# Patient Record
Sex: MALE | Race: WHITE | NOT HISPANIC OR LATINO | Employment: UNEMPLOYED | URBAN - METROPOLITAN AREA
[De-identification: names, ages, dates, MRNs, and addresses within clinical notes are randomized per-mention and may not be internally consistent; named-entity substitution may affect disease eponyms.]

---

## 2017-10-18 PROBLEM — F29 PSYCHOSIS: Status: ACTIVE | Noted: 2017-10-18

## 2017-10-20 PROBLEM — F31.30 BIPOLAR AFFECTIVE DISORDER, CURRENT EPISODE DEPRESSED: Status: ACTIVE | Noted: 2017-10-20

## 2017-10-20 PROBLEM — F10.10 ALCOHOL ABUSE: Status: ACTIVE | Noted: 2017-10-20

## 2017-10-20 PROBLEM — F39 UNSPECIFIED MOOD (AFFECTIVE) DISORDER: Status: ACTIVE | Noted: 2017-10-20

## 2017-10-20 PROBLEM — F17.210 CIGARETTE NICOTINE DEPENDENCE WITHOUT COMPLICATION: Status: ACTIVE | Noted: 2017-10-20

## 2017-10-20 PROBLEM — F15.20: Status: ACTIVE | Noted: 2017-10-20

## 2017-10-22 PROBLEM — E78.5 HYPERLIPIDEMIA: Status: ACTIVE | Noted: 2017-10-22

## 2017-10-25 PROBLEM — F31.75 BIPOLAR 1 DISORDER, DEPRESSED, PARTIAL REMISSION: Status: ACTIVE | Noted: 2017-10-20

## 2017-10-25 PROBLEM — F31.30 BIPOLAR AFFECTIVE DISORDER, CURRENT EPISODE DEPRESSED: Status: ACTIVE | Noted: 2017-10-25

## 2017-10-25 PROBLEM — F29 PSYCHOSIS: Status: RESOLVED | Noted: 2017-10-18 | Resolved: 2017-10-25

## 2017-10-25 PROBLEM — F63.9 IMPULSE CONTROL DISORDER IN ADULT: Status: ACTIVE | Noted: 2017-10-25

## 2017-10-25 PROBLEM — F39 UNSPECIFIED MOOD (AFFECTIVE) DISORDER: Status: RESOLVED | Noted: 2017-10-20 | Resolved: 2017-10-25

## 2018-03-21 ENCOUNTER — HOSPITAL ENCOUNTER (EMERGENCY)
Facility: HOSPITAL | Age: 33
Discharge: PSYCHIATRIC OR SUBSTANCE USE FACILITY | End: 2018-03-21
Attending: EMERGENCY MEDICINE
Payer: MEDICAID

## 2018-03-21 VITALS
WEIGHT: 211.31 LBS | OXYGEN SATURATION: 96 % | HEIGHT: 72 IN | SYSTOLIC BLOOD PRESSURE: 112 MMHG | HEART RATE: 86 BPM | RESPIRATION RATE: 20 BRPM | BODY MASS INDEX: 28.62 KG/M2 | TEMPERATURE: 98 F | DIASTOLIC BLOOD PRESSURE: 70 MMHG

## 2018-03-21 DIAGNOSIS — F31.32 BIPOLAR AFFECTIVE DISORDER, CURRENTLY DEPRESSED, MODERATE: ICD-10-CM

## 2018-03-21 DIAGNOSIS — R45.851 SUICIDAL IDEATION: Primary | ICD-10-CM

## 2018-03-21 PROBLEM — T50.901A ACCIDENTAL OVERDOSE: Status: ACTIVE | Noted: 2018-03-21

## 2018-03-21 LAB
ALBUMIN SERPL BCP-MCNC: 4.8 G/DL
ALP SERPL-CCNC: 58 U/L
ALT SERPL W/O P-5'-P-CCNC: 15 U/L
AMPHET+METHAMPHET UR QL: NEGATIVE
ANION GAP SERPL CALC-SCNC: 10 MMOL/L
APAP SERPL-MCNC: <3 UG/ML
AST SERPL-CCNC: 32 U/L
BARBITURATES UR QL SCN>200 NG/ML: NEGATIVE
BASOPHILS # BLD AUTO: 0.01 K/UL
BASOPHILS NFR BLD: 0.1 %
BENZODIAZ UR QL SCN>200 NG/ML: NEGATIVE
BILIRUB SERPL-MCNC: 0.7 MG/DL
BILIRUB UR QL STRIP: NEGATIVE
BUN SERPL-MCNC: 9 MG/DL
BZE UR QL SCN: NEGATIVE
CALCIUM SERPL-MCNC: 10 MG/DL
CANNABINOIDS UR QL SCN: NORMAL
CHLORIDE SERPL-SCNC: 106 MMOL/L
CLARITY UR: CLEAR
CO2 SERPL-SCNC: 24 MMOL/L
COLOR UR: YELLOW
CREAT SERPL-MCNC: 1.1 MG/DL
CREAT UR-MCNC: 189.9 MG/DL
DIFFERENTIAL METHOD: ABNORMAL
EOSINOPHIL # BLD AUTO: 0 K/UL
EOSINOPHIL NFR BLD: 0.3 %
ERYTHROCYTE [DISTWIDTH] IN BLOOD BY AUTOMATED COUNT: 13.9 %
EST. GFR  (AFRICAN AMERICAN): >60 ML/MIN/1.73 M^2
EST. GFR  (NON AFRICAN AMERICAN): >60 ML/MIN/1.73 M^2
ETHANOL SERPL-MCNC: <10 MG/DL
GLUCOSE SERPL-MCNC: 66 MG/DL
GLUCOSE UR QL STRIP: NEGATIVE
HCT VFR BLD AUTO: 47 %
HGB BLD-MCNC: 16.1 G/DL
HGB UR QL STRIP: NEGATIVE
KETONES UR QL STRIP: ABNORMAL
LEUKOCYTE ESTERASE UR QL STRIP: NEGATIVE
LYMPHOCYTES # BLD AUTO: 2.5 K/UL
LYMPHOCYTES NFR BLD: 24.5 %
MCH RBC QN AUTO: 31.6 PG
MCHC RBC AUTO-ENTMCNC: 34.3 G/DL
MCV RBC AUTO: 92 FL
METHADONE UR QL SCN>300 NG/ML: NEGATIVE
MONOCYTES # BLD AUTO: 1.1 K/UL
MONOCYTES NFR BLD: 10.9 %
NEUTROPHILS # BLD AUTO: 6.5 K/UL
NEUTROPHILS NFR BLD: 64.2 %
NITRITE UR QL STRIP: NEGATIVE
OPIATES UR QL SCN: NEGATIVE
PCP UR QL SCN>25 NG/ML: NEGATIVE
PH UR STRIP: 7 [PH] (ref 5–8)
PLATELET # BLD AUTO: 338 K/UL
PMV BLD AUTO: 8.8 FL
POTASSIUM SERPL-SCNC: 3.7 MMOL/L
PROT SERPL-MCNC: 7.9 G/DL
PROT UR QL STRIP: NEGATIVE
RBC # BLD AUTO: 5.09 M/UL
SALICYLATES SERPL-MCNC: <5 MG/DL
SODIUM SERPL-SCNC: 140 MMOL/L
SP GR UR STRIP: 1.02 (ref 1–1.03)
TOXICOLOGY INFORMATION: NORMAL
TSH SERPL DL<=0.005 MIU/L-ACNC: 1.41 UIU/ML
URN SPEC COLLECT METH UR: ABNORMAL
UROBILINOGEN UR STRIP-ACNC: NEGATIVE EU/DL
WBC # BLD AUTO: 10.17 K/UL

## 2018-03-21 PROCEDURE — 80053 COMPREHEN METABOLIC PANEL: CPT

## 2018-03-21 PROCEDURE — 99283 EMERGENCY DEPT VISIT LOW MDM: CPT | Mod: AF,HB,, | Performed by: PSYCHIATRY & NEUROLOGY

## 2018-03-21 PROCEDURE — 84443 ASSAY THYROID STIM HORMONE: CPT

## 2018-03-21 PROCEDURE — 80320 DRUG SCREEN QUANTALCOHOLS: CPT

## 2018-03-21 PROCEDURE — 99285 EMERGENCY DEPT VISIT HI MDM: CPT

## 2018-03-21 PROCEDURE — 80329 ANALGESICS NON-OPIOID 1 OR 2: CPT

## 2018-03-21 PROCEDURE — 85025 COMPLETE CBC W/AUTO DIFF WBC: CPT

## 2018-03-21 PROCEDURE — 80307 DRUG TEST PRSMV CHEM ANLYZR: CPT

## 2018-03-21 PROCEDURE — 81003 URINALYSIS AUTO W/O SCOPE: CPT

## 2018-03-21 NOTE — ED NOTES
Patient c/o suicidal thoughts that began yesterday; reports he did not try to do anything to take his life, but reports he has had thoughts of harming himself.    Patient identifiers verified and correct for Dimitrios Arzola.    LOC: The patient is awake, alert and aware of environment with an appropriate affect, the patient is oriented x 3 and speaking appropriately.  APPEARANCE: Patient resting comfortably and in no acute distress, patient is clean and well groomed, patient's clothing is properly fastened.  SKIN: The skin is warm and dry, color consistent with ethnicity, patient has normal skin turgor and moist mucus membranes, skin intact, no breakdown or bruising noted. Abrasion to right knee, scabbed, abrasion to right ankle, scabbed.  MUSCULOSKELETAL: Patient moving all extremities spontaneously.  RESPIRATORY: Airway is open and patent, respirations are spontaneous.  CARDIAC: Patient has a normal rate, no periphreal edema noted, capillary refill < 3 seconds.  ABDOMEN: Soft and non tender to palpation.

## 2018-03-21 NOTE — ED PROVIDER NOTES
"SCRIBE #1 NOTE: I, Yamil Gonsalez, am scribing for, and in the presence of, Chuck Valencia MD. I have scribed the entire note.      History      Chief Complaint   Patient presents with    Suicidal     Pt states, "I take about 5-6 boxes of Coricidin a day and I started feeling suicidal today and I need some help."       Review of patient's allergies indicates:   Allergen Reactions    Risperdal [risperidone] Other (See Comments)     gynecomastica        HPI   HPI    3/21/2018, 2:52 PM   History obtained from the patient      History of Present Illness: Dimitrios Arzola is a 32 y.o. male patient who presents to the Emergency Department for SI which onset today. Symptoms are constant and moderate in severity. Pt takes about 5-6 boxes of Coricidin a day. Pt states "I'm ready to die." No mitigating or exacerbating factors reported. No associated sx reported. Patient denies any fever, chills, n/v/d, HI, hallucinations, ETOH use, IV drug use, and all other sxs at this time. No further complaints or concerns at this time.         Arrival mode: Personal vehicle     PCP: Primary Doctor No       Past Medical History:  Past Medical History:   Diagnosis Date    ADHD (attention deficit hyperactivity disorder)     Alcohol abuse     Anxiety     Bipolar affective     Depression     History of psychiatric hospitalization     Hx of psychiatric care     Psychiatric problem     Psychosis 10/18/2017    Therapy        Past Surgical History:  Past Surgical History:   Procedure Laterality Date    ANKLE SURGERY      right         Family History:  Family History   Problem Relation Age of Onset    Family history unknown: Yes       Social History:  Social History     Social History Main Topics    Smoking status: Current Every Day Smoker     Packs/day: 0.50     Types: Cigarettes    Smokeless tobacco: Never Used    Alcohol use Yes      Comment: Vodka occasionally     Drug use: Yes     Types: Marijuana    Sexual activity: " Yes     Partners: Female     Birth control/ protection: None       ROS   Review of Systems   Constitutional: Negative for chills and fever.   HENT: Negative for sore throat.    Respiratory: Negative for shortness of breath.    Cardiovascular: Negative for chest pain.   Gastrointestinal: Negative for diarrhea, nausea and vomiting.   Genitourinary: Negative for dysuria.   Musculoskeletal: Negative for back pain.   Skin: Negative for rash.   Neurological: Negative for dizziness, weakness, light-headedness, numbness and headaches.   Hematological: Does not bruise/bleed easily.   Psychiatric/Behavioral: Positive for suicidal ideas. Negative for hallucinations.        - HI  - ETOH use  - IV drug use       Physical Exam      Initial Vitals [03/21/18 1438]   BP Pulse Resp Temp SpO2   (!) 162/113 106 20 98.8 °F (37.1 °C) 96 %      MAP       129.33          Physical Exam  Nursing Notes and Vital Signs Reviewed.  Constitutional: Patient is in no acute distress. Well-developed and well-nourished.  Head: Atraumatic. Normocephalic.  Eyes: PERRL. EOM intact. Conjunctivae are not pale. No scleral icterus.  ENT: Mucous membranes are moist. Oropharynx is clear and symmetric.    Neck: Supple. Full ROM. No lymphadenopathy.  Cardiovascular: Regular rate. Regular rhythm. No murmurs, rubs, or gallops. Distal pulses are 2+ and symmetric.  Pulmonary/Chest: No respiratory distress. Clear to auscultation bilaterally. No wheezing or rales.  Abdominal: Soft and non-distended.  There is no tenderness.  No rebound, guarding, or rigidity.   Musculoskeletal: Moves all extremities. No obvious deformities. No edema.   Skin: Warm and dry.  Neurological:  Alert, awake, and appropriate.  Normal speech.  No acute focal neurological deficits are appreciated.  Psychiatric:               Behavior: reluctant to participate, eye contact minimal              Mood and Affect: depressed affect              Thought Process: goal directed              Suicidal  Ideations: Yes              Suicidal Plan: General plan to harm self.              Homicidal Ideations: No              Hallucinations: none      ED Course    Procedures  ED Vital Signs:  Vitals:    03/21/18 1438   BP: (!) 162/113   Pulse: 106   Resp: 20   Temp: 98.8 °F (37.1 °C)   TempSrc: Oral   SpO2: 96%   Weight: 95.8 kg (211 lb 5 oz)   Height: 6' (1.829 m)       Abnormal Lab Results:  Labs Reviewed   CBC W/ AUTO DIFFERENTIAL - Abnormal; Notable for the following:        Result Value    MCH 31.6 (*)     MPV 8.8 (*)     Mono # 1.1 (*)     All other components within normal limits   COMPREHENSIVE METABOLIC PANEL - Abnormal; Notable for the following:     Glucose 66 (*)     All other components within normal limits   URINALYSIS - Abnormal; Notable for the following:     Ketones, UA Trace (*)     All other components within normal limits   SALICYLATE LEVEL - Abnormal; Notable for the following:     Salicylate Lvl <5.0 (*)     All other components within normal limits   ACETAMINOPHEN LEVEL - Abnormal; Notable for the following:     Acetaminophen (Tylenol), Serum <3.0 (*)     All other components within normal limits   TSH   DRUG SCREEN PANEL, URINE EMERGENCY   ALCOHOL,MEDICAL (ETHANOL)        All Lab Results:  Results for orders placed or performed during the hospital encounter of 03/21/18   CBC auto differential   Result Value Ref Range    WBC 10.17 3.90 - 12.70 K/uL    RBC 5.09 4.60 - 6.20 M/uL    Hemoglobin 16.1 14.0 - 18.0 g/dL    Hematocrit 47.0 40.0 - 54.0 %    MCV 92 82 - 98 fL    MCH 31.6 (H) 27.0 - 31.0 pg    MCHC 34.3 32.0 - 36.0 g/dL    RDW 13.9 11.5 - 14.5 %    Platelets 338 150 - 350 K/uL    MPV 8.8 (L) 9.2 - 12.9 fL    Gran # (ANC) 6.5 1.8 - 7.7 K/uL    Lymph # 2.5 1.0 - 4.8 K/uL    Mono # 1.1 (H) 0.3 - 1.0 K/uL    Eos # 0.0 0.0 - 0.5 K/uL    Baso # 0.01 0.00 - 0.20 K/uL    Gran% 64.2 38.0 - 73.0 %    Lymph% 24.5 18.0 - 48.0 %    Mono% 10.9 4.0 - 15.0 %    Eosinophil% 0.3 0.0 - 8.0 %    Basophil% 0.1  0.0 - 1.9 %    Differential Method Automated    Comprehensive metabolic panel   Result Value Ref Range    Sodium 140 136 - 145 mmol/L    Potassium 3.7 3.5 - 5.1 mmol/L    Chloride 106 95 - 110 mmol/L    CO2 24 23 - 29 mmol/L    Glucose 66 (L) 70 - 110 mg/dL    BUN, Bld 9 6 - 20 mg/dL    Creatinine 1.1 0.5 - 1.4 mg/dL    Calcium 10.0 8.7 - 10.5 mg/dL    Total Protein 7.9 6.0 - 8.4 g/dL    Albumin 4.8 3.5 - 5.2 g/dL    Total Bilirubin 0.7 0.1 - 1.0 mg/dL    Alkaline Phosphatase 58 55 - 135 U/L    AST 32 10 - 40 U/L    ALT 15 10 - 44 U/L    Anion Gap 10 8 - 16 mmol/L    eGFR if African American >60 >60 mL/min/1.73 m^2    eGFR if non African American >60 >60 mL/min/1.73 m^2   Urinalysis   Result Value Ref Range    Specimen UA Urine, Clean Catch     Color, UA Yellow Yellow, Straw, Geovanna    Appearance, UA Clear Clear    pH, UA 7.0 5.0 - 8.0    Specific Gravity, UA 1.025 1.005 - 1.030    Protein, UA Negative Negative    Glucose, UA Negative Negative    Ketones, UA Trace (A) Negative    Bilirubin (UA) Negative Negative    Occult Blood UA Negative Negative    Nitrite, UA Negative Negative    Urobilinogen, UA Negative <2.0 EU/dL    Leukocytes, UA Negative Negative   TSH   Result Value Ref Range    TSH 1.405 0.400 - 4.000 uIU/mL   Drug screen panel, emergency   Result Value Ref Range    Benzodiazepines Negative     Methadone metabolites Negative     Cocaine (Metab.) Negative     Opiate Scrn, Ur Negative     Barbiturate Screen, Ur Negative     Amphetamine Screen, Ur Negative     THC Presumptive Positive     Phencyclidine Negative     Creatinine, Random Ur 189.9 23.0 - 375.0 mg/dL    Toxicology Information SEE COMMENT    Ethanol   Result Value Ref Range    Alcohol, Medical, Serum <10 <10 mg/dL   Salicylate level   Result Value Ref Range    Salicylate Lvl <5.0 (L) 15.0 - 30.0 mg/dL   Acetaminophen level   Result Value Ref Range    Acetaminophen (Tylenol), Serum <3.0 (L) 10.0 - 20.0 ug/mL                The Emergency Provider  reviewed the vital signs and test results, which are outlined above.    ED Discussion     3:00 PM: The PEC hold has been issued by Dr. Valencia at this time for SI.    4:58 PM: Pt has been medically cleared by Dr. Valencia at this time. Reassessed pt at this time. Pt is resting comfortably and appears in no acute distress. There are no psychiatric services offered at this facility. D/w pt all pertinent ED information and plan to transfer to psychiatric facility for psychiatric treatment. Pt will be transported by personnel trained in CPR and CPI. All questions and complaints have been addressed at this time. Pt condition is stable at this time and is clear to transfer to psychiatric facility at this time.           ED Medication(s):  Medications - No data to display    New Prescriptions    No medications on file             Medical Decision Making    Medical Decision Making:   Clinical Tests:   Lab Tests: Ordered and Reviewed           Scribe Attestation:   Scribe #1: I performed the above scribed service and the documentation accurately describes the services I performed. I attest to the accuracy of the note.    Attending:   Physician Attestation Statement for Scribe #1: I, Chuck Valencia MD, personally performed the services described in this documentation, as scribed by Yamil Gonsalez, in my presence, and it is both accurate and complete.          Clinical Impression       ICD-10-CM ICD-9-CM   1. Suicidal ideation R45.851 V62.84       Disposition:   Disposition: Transferred  Condition: Stable         Chuck Valencia MD  03/21/18 1932

## 2018-03-22 NOTE — PROVIDER PROGRESS NOTES - EMERGENCY DEPT.
9:06 PM:  Pt is resting comfortably and is in no acute distress. Pt has been accepted at [facility] by Dr. Elizondo.  D/w pt all pertinent results. /w pt any concerns expressed at this time. Answered all questions. Pt expresses understanding at this time.

## 2018-03-22 NOTE — ED NOTES
Made contact with Lillie RN, pt's nurse. Informed Lillie that pt. Has been accepted to [facility].
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
## 2018-03-22 NOTE — ED NOTES
Received call from Kaylah ( Intake Spec) [facility]. Located at [address]. Pt has been accepted by Dr. Elizondo, please call report to  [phone] EXT. 2.

## 2018-03-22 NOTE — ED NOTES
Pt's room secured per protocol. Pt's belongings secured and pt in grey gown and yellow socks. Pt being directly monitored by carole Joe at this time. Pt. Laying  in bed. No acute distress, RR equal and non labored, VSS. Bed in low and locked position. Will continue to monitor.

## 2018-03-22 NOTE — ED NOTES
Pt's room secured per protocol. Pt's belongings secured and pt in grey gown and yellow socks. Pt being directly monitored by carole Stoll at this time. Pt. Laying  in bed. No acute distress, RR equal and non labored, VSS. Bed in low and locked position. Will continue to monitor.

## 2018-03-22 NOTE — ED NOTES
Pec received and faxed to [facility], [facility], [facility], [facility], [facility], [facility], [facility], [facility], [facility], [place], [facility], [facility], [facility], [place], [facility] ,[facility], [facility], [facility], [place],  [facility], [facility],  [facility], [place], [facility], [facility], [facility], [facility], [facility].

## 2018-10-11 ENCOUNTER — HOSPITAL ENCOUNTER (EMERGENCY)
Facility: HOSPITAL | Age: 33
Discharge: PSYCHIATRIC OR SUBSTANCE USE FACILITY | End: 2018-10-12
Attending: EMERGENCY MEDICINE
Payer: MEDICAID

## 2018-10-11 DIAGNOSIS — R45.851 SUICIDAL IDEATION: Primary | ICD-10-CM

## 2018-10-11 DIAGNOSIS — F32.A DEPRESSION, UNSPECIFIED DEPRESSION TYPE: ICD-10-CM

## 2018-10-11 DIAGNOSIS — R00.0 TACHYCARDIA: ICD-10-CM

## 2018-10-11 LAB
ALBUMIN SERPL BCP-MCNC: 4.4 G/DL
ALP SERPL-CCNC: 39 U/L
ALT SERPL W/O P-5'-P-CCNC: 53 U/L
AMPHET+METHAMPHET UR QL: NEGATIVE
ANION GAP SERPL CALC-SCNC: 17 MMOL/L
APAP SERPL-MCNC: <3 UG/ML
AST SERPL-CCNC: 46 U/L
BARBITURATES UR QL SCN>200 NG/ML: NEGATIVE
BASOPHILS # BLD AUTO: 0.03 K/UL
BASOPHILS NFR BLD: 0.2 %
BENZODIAZ UR QL SCN>200 NG/ML: NEGATIVE
BILIRUB SERPL-MCNC: 0.5 MG/DL
BILIRUB UR QL STRIP: NEGATIVE
BUN SERPL-MCNC: 11 MG/DL
BZE UR QL SCN: NEGATIVE
CALCIUM SERPL-MCNC: 10 MG/DL
CANNABINOIDS UR QL SCN: NEGATIVE
CHLORIDE SERPL-SCNC: 101 MMOL/L
CLARITY UR: CLEAR
CO2 SERPL-SCNC: 18 MMOL/L
COLOR UR: YELLOW
CREAT SERPL-MCNC: 1.1 MG/DL
CREAT UR-MCNC: 88.9 MG/DL
DIFFERENTIAL METHOD: ABNORMAL
EOSINOPHIL # BLD AUTO: 0 K/UL
EOSINOPHIL NFR BLD: 0.1 %
ERYTHROCYTE [DISTWIDTH] IN BLOOD BY AUTOMATED COUNT: 13.1 %
EST. GFR  (AFRICAN AMERICAN): >60 ML/MIN/1.73 M^2
EST. GFR  (NON AFRICAN AMERICAN): >60 ML/MIN/1.73 M^2
ETHANOL SERPL-MCNC: <10 MG/DL
GLUCOSE SERPL-MCNC: 74 MG/DL
GLUCOSE UR QL STRIP: NEGATIVE
HCT VFR BLD AUTO: 43.1 %
HGB BLD-MCNC: 14.7 G/DL
HGB UR QL STRIP: NEGATIVE
KETONES UR QL STRIP: ABNORMAL
LEUKOCYTE ESTERASE UR QL STRIP: NEGATIVE
LYMPHOCYTES # BLD AUTO: 1.7 K/UL
LYMPHOCYTES NFR BLD: 9.6 %
MCH RBC QN AUTO: 31.9 PG
MCHC RBC AUTO-ENTMCNC: 34.1 G/DL
MCV RBC AUTO: 94 FL
METHADONE UR QL SCN>300 NG/ML: NEGATIVE
MONOCYTES # BLD AUTO: 1.8 K/UL
MONOCYTES NFR BLD: 10.4 %
NEUTROPHILS # BLD AUTO: 13.8 K/UL
NEUTROPHILS NFR BLD: 79.7 %
NITRITE UR QL STRIP: NEGATIVE
OPIATES UR QL SCN: NEGATIVE
PCP UR QL SCN>25 NG/ML: NEGATIVE
PH UR STRIP: 6 [PH] (ref 5–8)
PLATELET # BLD AUTO: 265 K/UL
PMV BLD AUTO: 9.3 FL
POTASSIUM SERPL-SCNC: 4.2 MMOL/L
PROT SERPL-MCNC: 7.7 G/DL
PROT UR QL STRIP: NEGATIVE
RBC # BLD AUTO: 4.61 M/UL
SALICYLATES SERPL-MCNC: <5 MG/DL
SODIUM SERPL-SCNC: 136 MMOL/L
SP GR UR STRIP: 1.02 (ref 1–1.03)
TOXICOLOGY INFORMATION: NORMAL
TSH SERPL DL<=0.005 MIU/L-ACNC: 0.48 UIU/ML
URN SPEC COLLECT METH UR: ABNORMAL
UROBILINOGEN UR STRIP-ACNC: NEGATIVE EU/DL
WBC # BLD AUTO: 17.31 K/UL

## 2018-10-11 PROCEDURE — 81003 URINALYSIS AUTO W/O SCOPE: CPT | Mod: 59

## 2018-10-11 PROCEDURE — 80320 DRUG SCREEN QUANTALCOHOLS: CPT

## 2018-10-11 PROCEDURE — 93010 ELECTROCARDIOGRAM REPORT: CPT | Mod: ,,, | Performed by: INTERNAL MEDICINE

## 2018-10-11 PROCEDURE — 99213 OFFICE O/P EST LOW 20 MIN: CPT | Mod: GT,AF,HB, | Performed by: PSYCHIATRY & NEUROLOGY

## 2018-10-11 PROCEDURE — 84443 ASSAY THYROID STIM HORMONE: CPT

## 2018-10-11 PROCEDURE — 80307 DRUG TEST PRSMV CHEM ANLYZR: CPT

## 2018-10-11 PROCEDURE — 99285 EMERGENCY DEPT VISIT HI MDM: CPT

## 2018-10-11 PROCEDURE — 85025 COMPLETE CBC W/AUTO DIFF WBC: CPT

## 2018-10-11 PROCEDURE — 80329 ANALGESICS NON-OPIOID 1 OR 2: CPT

## 2018-10-11 PROCEDURE — 80053 COMPREHEN METABOLIC PANEL: CPT

## 2018-10-11 RX ORDER — GABAPENTIN 100 MG/1
200 CAPSULE ORAL 3 TIMES DAILY
COMMUNITY
End: 2018-10-22

## 2018-10-11 NOTE — ED PROVIDER NOTES
"SCRIBE #1 NOTE: I, Jennifer Russlel, am scribing for, and in the presence of, Chuck Valencia MD. I have scried the HPI, ROS, and PEx.     SCRIBE #2 NOTE: I, Osiel Prasad, am scribing for, and in the presence of,  Walker Booker MD. I have scribed the remaining portions of the note not scribed by Scribe #1.      History     Chief Complaint   Patient presents with    Suicidal     Pt reports +SI without plan. Denies HI     Review of patient's allergies indicates:   Allergen Reactions    Risperdal [risperidone] Other (See Comments)     gynecomastica         History of Present Illness     HPI    10/11/2018, 5:57 PM  History obtained from the patient      History of Present Illness: Dimitrios Arzola is a 33 y.o. male patient who presents to the Emergency Department for evaluation of SI. Pt states, "I just want to end it all." Symptoms are constant and moderate in severity.  No mitigating or exacerbating factors reported. No associated sxs. Patient denies any fever, chills, EtOH use, IV drug use, hallucinations, HI, and all other sxs at this time. No further complaints or concerns at this time.       Arrival mode: Personal vehicle    PCP: Primary Doctor No        Past Medical History:  Past Medical History:   Diagnosis Date    ADHD (attention deficit hyperactivity disorder)     Alcohol abuse     Anxiety     Bipolar affective     Depression     History of psychiatric hospitalization     Hx of psychiatric care     Overdose 10/2018    ambien and chloracedin D    Psychiatric problem     Psychosis 10/18/2017    Therapy        Past Surgical History:  Past Surgical History:   Procedure Laterality Date    ANKLE SURGERY      right         Family History:  Family History   Family history unknown: Yes       Social History:  Social History     Tobacco Use    Smoking status: Current Every Day Smoker     Packs/day: 0.50     Types: Cigarettes    Smokeless tobacco: Never Used   Substance and Sexual Activity    " Alcohol use: Yes     Comment: Vodka occasionally     Drug use: Yes     Types: Marijuana    Sexual activity: Yes     Partners: Female     Birth control/protection: None        Review of Systems     Review of Systems   Constitutional: Negative for chills, diaphoresis and fever.   HENT: Negative for congestion, rhinorrhea and sore throat.    Respiratory: Negative for cough and shortness of breath.    Cardiovascular: Negative for chest pain and leg swelling.   Gastrointestinal: Negative for abdominal pain, diarrhea, nausea and vomiting.   Genitourinary: Negative for dysuria, frequency and hematuria.   Musculoskeletal: Negative for back pain and neck pain.   Skin: Negative for rash and wound.   Neurological: Negative for dizziness, weakness, numbness and headaches.   Hematological: Does not bruise/bleed easily.   Psychiatric/Behavioral: Positive for suicidal ideas. Negative for confusion, hallucinations and self-injury. The patient is not nervous/anxious.         (-) HI   All other systems reviewed and are negative.       Physical Exam     Initial Vitals [10/11/18 1737]   BP Pulse Resp Temp SpO2   (!) 154/74 (!) 143 20 99 °F (37.2 °C) (!) 93 %      MAP       --          Physical Exam  Nursing Notes and Vital Signs Reviewed.  Constitutional: Patient is in no acute distress. Disheveled.  Head: Atraumatic. Normocephalic.  Eyes: PERRL. EOM intact. Conjunctivae are not pale. No scleral icterus.  ENT: Mucous membranes are moist. Oropharynx is clear and symmetric.    Neck: Supple. Full ROM. No lymphadenopathy.  Cardiovascular: Regular rate. Regular rhythm. No murmurs, rubs, or gallops. Distal pulses are 2+ and symmetric.  Pulmonary/Chest: No respiratory distress. Clear to auscultation bilaterally. No wheezing or rales.  Abdominal: Soft and non-distended.  There is no tenderness.  No rebound, guarding, or rigidity.   Musculoskeletal: Moves all extremities. No obvious deformities. No edema.  Skin: Warm and dry.  Neurological:   Alert, awake, and appropriate.  Normal speech.  No acute focal neurological deficits are appreciated.  Psychiatric:               Behavior: cooperative              Mood and Affect: depressed affect              Thought Process: within normal limits              Suicidal Ideations: Yes              Suicidal Plan: No specific plan to harm self              Homicidal Ideations: No              Hallucinations: none       ED Course   Procedures  ED Vital Signs:  Vitals:    10/11/18 1737 10/11/18 1806 10/11/18 2006   BP: (!) 154/74 (!) 107/45 114/67   Pulse: (!) 143 (!) 135 (!) 115   Resp: 20 (!) 22 19   Temp: 99 °F (37.2 °C) (!) 100.6 °F (38.1 °C) 99.8 °F (37.7 °C)   TempSrc: Oral Oral Oral   SpO2: (!) 93% 96% 95%   Weight: 112.5 kg (248 lb)     Height: 6' (1.829 m)         Abnormal Lab Results:  Labs Reviewed   CBC W/ AUTO DIFFERENTIAL - Abnormal; Notable for the following components:       Result Value    WBC 17.31 (*)     MCH 31.9 (*)     Gran # (ANC) 13.8 (*)     Mono # 1.8 (*)     Gran% 79.7 (*)     Lymph% 9.6 (*)     All other components within normal limits   COMPREHENSIVE METABOLIC PANEL - Abnormal; Notable for the following components:    CO2 18 (*)     Alkaline Phosphatase 39 (*)     AST 46 (*)     ALT 53 (*)     Anion Gap 17 (*)     All other components within normal limits   URINALYSIS, REFLEX TO URINE CULTURE - Abnormal; Notable for the following components:    Ketones, UA 1+ (*)     All other components within normal limits    Narrative:     Preferred Collection Type->Urine, Clean Catch   SALICYLATE LEVEL - Abnormal; Notable for the following components:    Salicylate Lvl <5.0 (*)     All other components within normal limits   ACETAMINOPHEN LEVEL - Abnormal; Notable for the following components:    Acetaminophen (Tylenol), Serum <3.0 (*)     All other components within normal limits   TSH   DRUG SCREEN PANEL, URINE EMERGENCY    Narrative:     Preferred Collection Type->Urine, Clean Catch   ALCOHOL,MEDICAL  (ETHANOL)        All Lab Results:  Results for orders placed or performed during the hospital encounter of 10/11/18   CBC auto differential   Result Value Ref Range    WBC 17.31 (H) 3.90 - 12.70 K/uL    RBC 4.61 4.60 - 6.20 M/uL    Hemoglobin 14.7 14.0 - 18.0 g/dL    Hematocrit 43.1 40.0 - 54.0 %    MCV 94 82 - 98 fL    MCH 31.9 (H) 27.0 - 31.0 pg    MCHC 34.1 32.0 - 36.0 g/dL    RDW 13.1 11.5 - 14.5 %    Platelets 265 150 - 350 K/uL    MPV 9.3 9.2 - 12.9 fL    Gran # (ANC) 13.8 (H) 1.8 - 7.7 K/uL    Lymph # 1.7 1.0 - 4.8 K/uL    Mono # 1.8 (H) 0.3 - 1.0 K/uL    Eos # 0.0 0.0 - 0.5 K/uL    Baso # 0.03 0.00 - 0.20 K/uL    Gran% 79.7 (H) 38.0 - 73.0 %    Lymph% 9.6 (L) 18.0 - 48.0 %    Mono% 10.4 4.0 - 15.0 %    Eosinophil% 0.1 0.0 - 8.0 %    Basophil% 0.2 0.0 - 1.9 %    Differential Method Automated    Comprehensive metabolic panel   Result Value Ref Range    Sodium 136 136 - 145 mmol/L    Potassium 4.2 3.5 - 5.1 mmol/L    Chloride 101 95 - 110 mmol/L    CO2 18 (L) 23 - 29 mmol/L    Glucose 74 70 - 110 mg/dL    BUN, Bld 11 6 - 20 mg/dL    Creatinine 1.1 0.5 - 1.4 mg/dL    Calcium 10.0 8.7 - 10.5 mg/dL    Total Protein 7.7 6.0 - 8.4 g/dL    Albumin 4.4 3.5 - 5.2 g/dL    Total Bilirubin 0.5 0.1 - 1.0 mg/dL    Alkaline Phosphatase 39 (L) 55 - 135 U/L    AST 46 (H) 10 - 40 U/L    ALT 53 (H) 10 - 44 U/L    Anion Gap 17 (H) 8 - 16 mmol/L    eGFR if African American >60 >60 mL/min/1.73 m^2    eGFR if non African American >60 >60 mL/min/1.73 m^2   Urinalysis, Reflex to Urine Culture Urine, Clean Catch   Result Value Ref Range    Specimen UA Urine, Clean Catch     Color, UA Yellow Yellow, Straw, Geovanna    Appearance, UA Clear Clear    pH, UA 6.0 5.0 - 8.0    Specific Gravity, UA 1.020 1.005 - 1.030    Protein, UA Negative Negative    Glucose, UA Negative Negative    Ketones, UA 1+ (A) Negative    Bilirubin (UA) Negative Negative    Occult Blood UA Negative Negative    Nitrite, UA Negative Negative    Urobilinogen, UA Negative  <2.0 EU/dL    Leukocytes, UA Negative Negative   TSH   Result Value Ref Range    TSH 0.480 0.400 - 4.000 uIU/mL   Drug screen panel, emergency   Result Value Ref Range    Benzodiazepines Negative     Methadone metabolites Negative     Cocaine (Metab.) Negative     Opiate Scrn, Ur Negative     Barbiturate Screen, Ur Negative     Amphetamine Screen, Ur Negative     THC Negative     Phencyclidine Negative     Creatinine, Random Ur 88.9 23.0 - 375.0 mg/dL    Toxicology Information SEE COMMENT    Ethanol   Result Value Ref Range    Alcohol, Medical, Serum <10 <10 mg/dL   Salicylate level   Result Value Ref Range    Salicylate Lvl <5.0 (L) 15.0 - 30.0 mg/dL   Acetaminophen level   Result Value Ref Range    Acetaminophen (Tylenol), Serum <3.0 (L) 10.0 - 20.0 ug/mL         Imaging Results:  Imaging Results    None          The EKG was ordered, reviewed, and independently interpreted by the ED provider.  Interpretation time: 18:20  Rate: 131 BPM  Rhythm: sinus tachycardia  Interpretation: Otherwise normal. No ST&T abnormalities. No STEMI.             The Emergency Provider reviewed the vital signs and test results, which are outlined above.     ED Discussion     7:39 PM: Dr. Valencia discussed the pt's case with Dr. Kelly (Psychiatry) who agrees with [facility].    8:04 PM: Dr. Valencia transfers care of pt to Dr. Booker pending lab results.    8:49 PM: Pt Is medically clear for psychiatric placement.    8:54 PM: Pt has been medically cleared by Dr. Walker Booker MD at this time. Reassessed pt at this time. Pt is resting comfortably and appears in no acute distress. There are no psychiatric services offered at this facility. D/w pt all pertinent ED information and plan to transfer to psychiatric facility for psychiatric treatment. Pt verbalizes understanding. Patient being transferred by [facility] for ongoing personal protection en route. Pt has been made aware of all risks and benefits associated with transfer, including but not limited  to death, MVC, loss of vital signs, and/or permanent disability. Benefits include ability to be treated at an inpatient psychiatric facility. Pt will be transported by personnel trained in CPR and CPI. Patient understands that there could be unforeseen motor vehicle accidents, inclement weather, or loss of vital signs that could result in potential death or permanent disability. All questions and complaints have been addressed at this time. Pt condition is stable at this time and is clear to transfer to psychiatric facility at this time.     11:50 PM: Pt has been accepted at [facility]. Accepting physician is Dr. Jerez. Pt will be transported by [facility] for ongoing personal protection en route. Pt will be transported by personnel trained in CPR and CPI. Risks and benefits of transfer/travel were discussed with pt priorly.         ED Medication(s):  Medications - No data to display       Medical Decision Making     Medical Decision Making:   Clinical Tests:   Lab Tests: Ordered and Reviewed  Medical Tests: Ordered and Reviewed             Scribe Attestation:   Scribe #1: I performed the above scribed service and the documentation accurately describes the services I performed. I attest to the accuracy of the note.     Attending:   Physician Attestation Statement for Scribe #1: I, Chuck Valencia MD, personally performed the services described in this documentation, as scribed by Jennifer Russell, in my presence, and it is both accurate and complete.          Clinical Impression       ICD-10-CM ICD-9-CM   1. Suicidal ideation R45.851 V62.84   2. Tachycardia R00.0 785.0   3. Depression, unspecified depression type F32.9 311       Disposition:   Disposition: Transferred  Condition: Stable            Scribe Attestation:   Scribe #2: I performed the above scribed service and the documentation accurately describes the services I performed. I attest to the accuracy of the note.    Attending Attestation:            Physician Attestation for Scribe:    Physician Attestation Statement for Scribe #2: I, Walker Booker MD, reviewed documentation, as scribed by Osiel Prasad in my presence, and it is both accurate and complete. I also acknowledge and confirm the content of the note done by Scribe #1.             Walker Booker Jr., MD  10/11/18 6219

## 2018-10-11 NOTE — ED NOTES
One large duffle bag of personal hygeine supplies and a change of clothes. One bag of home meds, no narcotics. All placed in locker #1.

## 2018-10-11 NOTE — ED NOTES
Poison control contacted concerning overdose, Nina recommended symptomatic care with basic labs and tylenol level, psych eval, and monitor for agitation and A/VH.

## 2018-10-12 VITALS
DIASTOLIC BLOOD PRESSURE: 62 MMHG | WEIGHT: 248 LBS | RESPIRATION RATE: 19 BRPM | SYSTOLIC BLOOD PRESSURE: 126 MMHG | HEIGHT: 72 IN | BODY MASS INDEX: 33.59 KG/M2 | HEART RATE: 110 BPM | OXYGEN SATURATION: 95 % | TEMPERATURE: 99 F

## 2018-10-12 NOTE — CONSULTS
Tele-Consultation to Emergency Department from Psychiatry    Patient agreeable to consultation via telepsychiatry.    Consultation started: 10/11/2018 at 7:15 pm   The chief complaint leading to psychiatric consultation is: SI  This consultation was requested by Dr. Chuck Trimble, the Emergency Department attending physician.  The location of the consulting psychiatrist is [address] [place] [address].  The patient location is [facility].    Patient Identification:  Dimitrios Arzola is a 33 y.o. male.    Patient information was obtained from patient.    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 Patient reported feelings of depression and SI to ED physician and admitted to overdose of ambien. On my evaluation he admits to feelings of depression including low mood, worthlessness, irritability, insomnia, and feeling hopeless. He reports having SI but states he is not sure if taking the Ambien was a suicide attempt. He does minimize SI when discussion of hospitalization came up but was consistent with reporting his feelings of depression despite compliance with medication. He is taking Depakote, Remeron ,Seroquel, and another medicine he cannot remember.     Psychiatric History:   Hospitalization: Yes  Medication Trials: Yes  Suicide Attempts: states he tied rope around his neck in past but did not go through with it  Violence: yes  Depression: yes  Ruby: yes in context of synthetic marijuana use  AH's: yes in context of synthetic marijuana use  Delusions: yes in context of synthetic marijuana use    Review of Systems:  Negative except as mentioned elsewhere    Past Medical History:   Past Medical History:   Diagnosis Date    ADHD (attention deficit hyperactivity disorder)     Alcohol abuse     Anxiety     Bipolar affective      "Depression     History of psychiatric hospitalization     Hx of psychiatric care     Overdose 10/2018    ambien and chloracedin D    Psychiatric problem     Psychosis 10/18/2017    Therapy         Allergies: see below  Review of patient's allergies indicates:   Allergen Reactions    Risperdal [risperidone] Other (See Comments)     gynecomastica       Medications in ER: Medications - No data to display    Medications at home: Depakote, Seroquel, Remeron     Substance Abuse History:   Alchohol: occasional  Drug: marijuana, past synthetic use      Legal History:   Past charges/incarcerations: 3 DUI's in the past, past arrest for domestic violence but charge was dropped  Pending charges: denies    Family Psychiatric History: unknown    Social History:   History of Physical/Sexual Abuse: unknown  Education: 10th grade    Employment/Disability: unemployed, applied for disability   Financial: unemployed  Relationship Status/Sexual Orientation: single   Children: 4 y/o son   Housing Status: lives with grandparents  Judaism: unknown   History: yes for 4 years   Recreational Activities: unknown  Access to Gun: denies     Current Evaluation:     Constitutional  Vitals:  Vitals:    10/11/18 1737 10/11/18 1806   BP: (!) 154/74 (!) 107/45   Pulse: (!) 143 (!) 135   Resp: 20 (!) 22   Temp: 99 °F (37.2 °C) (!) 100.6 °F (38.1 °C)   TempSrc: Oral Oral   SpO2: (!) 93% 96%   Weight: 112.5 kg (248 lb)    Height: 6' (1.829 m)       General:  unremarkable, age appropriate     Musculoskeletal  Muscle Strength/Tone:   moving arms normally   Gait & Station:   sitting on stretcher     Psychiatric  Level of Consciousness: alert  Orientation: oriented to person, place and time  Grooming: in hospital gown  Psychomotor Behavior: no agitation  Speech: normal in rate, rhythm and volume  Language: uses words appropriately  Mood: "depressed"  Affect: dysphoric  Thought Process: logical  Associations: intact  Thought Content: " +SI  Memory: intact  Attention: intact to interview  Fund of Knowledge: appears adequate  Insight: poor  Judgement: poor    Relevant Elements of Neurological Exam: no abnormality of posture noted    Assessment - Diagnosis - Goals:     Diagnosis/Impression: The patient presents with increasing depression, SI, and possible suicide attempt via Ambien overdose. He warrants inpatient psychiatric stabilization once he is medically stable due to ongoing safety concerns.     Rec:   -medical clearance  -PEC for inpatient psychiatric admission  -case discussed with Dr. Trimble      Time with patient: 16 minutes    More than 50% of the time was spent counseling/coordinating care    Laboratory Data:   Labs Reviewed   CBC W/ AUTO DIFFERENTIAL - Abnormal; Notable for the following components:       Result Value    WBC 17.31 (*)     MCH 31.9 (*)     Gran # (ANC) 13.8 (*)     Mono # 1.8 (*)     Gran% 79.7 (*)     Lymph% 9.6 (*)     All other components within normal limits   COMPREHENSIVE METABOLIC PANEL - Abnormal; Notable for the following components:    CO2 18 (*)     Alkaline Phosphatase 39 (*)     AST 46 (*)     ALT 53 (*)     Anion Gap 17 (*)     All other components within normal limits   URINALYSIS, REFLEX TO URINE CULTURE - Abnormal; Notable for the following components:    Ketones, UA 1+ (*)     All other components within normal limits    Narrative:     Preferred Collection Type->Urine, Clean Catch   TSH   DRUG SCREEN PANEL, URINE EMERGENCY   ALCOHOL,MEDICAL (ETHANOL)   SALICYLATE LEVEL   ACETAMINOPHEN LEVEL         Consulting clinician was informed of the encounter and consult note.    Consultation ended: 10/11/2018 at 7:31 pm

## 2018-10-12 NOTE — ED NOTES
Staff faxed pt's admission packet to [facility], [facility], [facility]

## 2018-10-12 NOTE — ED NOTES
Faxed [facility] information to [facility] will call in about 10-15 minutes to call and see if they received information. RN notified.

## 2018-10-12 NOTE — ED NOTES
Pt's admissions packet has been faxed to all [facility] related facilities. Will wait 30 minutes before seeking placement elsewhere.

## 2018-10-12 NOTE — ED NOTES
Received call from Steph at [facility].  Patient accepted under the care of Dr Jerez.  Number to call report:  [phone].

## 2018-10-22 ENCOUNTER — HOSPITAL ENCOUNTER (EMERGENCY)
Facility: HOSPITAL | Age: 33
Discharge: PSYCHIATRIC OR SUBSTANCE USE FACILITY | End: 2018-10-22
Attending: EMERGENCY MEDICINE
Payer: MEDICAID

## 2018-10-22 VITALS
TEMPERATURE: 99 F | OXYGEN SATURATION: 96 % | HEIGHT: 72 IN | WEIGHT: 244.69 LBS | DIASTOLIC BLOOD PRESSURE: 84 MMHG | RESPIRATION RATE: 19 BRPM | SYSTOLIC BLOOD PRESSURE: 154 MMHG | BODY MASS INDEX: 33.14 KG/M2 | HEART RATE: 97 BPM

## 2018-10-22 DIAGNOSIS — F29 PSYCHOSIS, UNSPECIFIED PSYCHOSIS TYPE: Primary | ICD-10-CM

## 2018-10-22 LAB
ALBUMIN SERPL BCP-MCNC: 4.9 G/DL
ALP SERPL-CCNC: 58 U/L
ALT SERPL W/O P-5'-P-CCNC: 41 U/L
AMPHET+METHAMPHET UR QL: NEGATIVE
ANION GAP SERPL CALC-SCNC: 14 MMOL/L
APAP SERPL-MCNC: <3 UG/ML
AST SERPL-CCNC: 47 U/L
BARBITURATES UR QL SCN>200 NG/ML: NEGATIVE
BASOPHILS # BLD AUTO: 0.02 K/UL
BASOPHILS NFR BLD: 0.2 %
BENZODIAZ UR QL SCN>200 NG/ML: NEGATIVE
BILIRUB SERPL-MCNC: 0.5 MG/DL
BILIRUB UR QL STRIP: ABNORMAL
BUN SERPL-MCNC: 13 MG/DL
BZE UR QL SCN: NEGATIVE
CALCIUM SERPL-MCNC: 9.8 MG/DL
CANNABINOIDS UR QL SCN: NEGATIVE
CHLORIDE SERPL-SCNC: 103 MMOL/L
CLARITY UR: CLEAR
CO2 SERPL-SCNC: 22 MMOL/L
COLOR UR: YELLOW
CREAT SERPL-MCNC: 1.1 MG/DL
CREAT UR-MCNC: 294.5 MG/DL
DIFFERENTIAL METHOD: ABNORMAL
EOSINOPHIL # BLD AUTO: 0 K/UL
EOSINOPHIL NFR BLD: 0.3 %
ERYTHROCYTE [DISTWIDTH] IN BLOOD BY AUTOMATED COUNT: 12.8 %
EST. GFR  (AFRICAN AMERICAN): >60 ML/MIN/1.73 M^2
EST. GFR  (NON AFRICAN AMERICAN): >60 ML/MIN/1.73 M^2
ETHANOL SERPL-MCNC: <10 MG/DL
GLUCOSE SERPL-MCNC: 94 MG/DL
GLUCOSE UR QL STRIP: NEGATIVE
HCT VFR BLD AUTO: 45.2 %
HGB BLD-MCNC: 15.7 G/DL
HGB UR QL STRIP: NEGATIVE
KETONES UR QL STRIP: ABNORMAL
LEUKOCYTE ESTERASE UR QL STRIP: NEGATIVE
LYMPHOCYTES # BLD AUTO: 1.6 K/UL
LYMPHOCYTES NFR BLD: 18.7 %
MCH RBC QN AUTO: 32.2 PG
MCHC RBC AUTO-ENTMCNC: 34.7 G/DL
MCV RBC AUTO: 93 FL
METHADONE UR QL SCN>300 NG/ML: NEGATIVE
MONOCYTES # BLD AUTO: 1 K/UL
MONOCYTES NFR BLD: 11.4 %
NEUTROPHILS # BLD AUTO: 6.1 K/UL
NEUTROPHILS NFR BLD: 69.4 %
NITRITE UR QL STRIP: NEGATIVE
OPIATES UR QL SCN: NEGATIVE
PCP UR QL SCN>25 NG/ML: NORMAL
PH UR STRIP: 6 [PH] (ref 5–8)
PLATELET # BLD AUTO: 360 K/UL
PMV BLD AUTO: 8.7 FL
POTASSIUM SERPL-SCNC: 3.9 MMOL/L
PROT SERPL-MCNC: 8.6 G/DL
PROT UR QL STRIP: ABNORMAL
RBC # BLD AUTO: 4.88 M/UL
SALICYLATES SERPL-MCNC: <5 MG/DL
SODIUM SERPL-SCNC: 139 MMOL/L
SP GR UR STRIP: >=1.03 (ref 1–1.03)
TOXICOLOGY INFORMATION: NORMAL
TSH SERPL DL<=0.005 MIU/L-ACNC: 1.65 UIU/ML
URN SPEC COLLECT METH UR: ABNORMAL
UROBILINOGEN UR STRIP-ACNC: NEGATIVE EU/DL
WBC # BLD AUTO: 8.77 K/UL

## 2018-10-22 PROCEDURE — 80320 DRUG SCREEN QUANTALCOHOLS: CPT

## 2018-10-22 PROCEDURE — 85025 COMPLETE CBC W/AUTO DIFF WBC: CPT

## 2018-10-22 PROCEDURE — 99213 OFFICE O/P EST LOW 20 MIN: CPT | Mod: GT,AF,HB, | Performed by: PSYCHIATRY & NEUROLOGY

## 2018-10-22 PROCEDURE — 80307 DRUG TEST PRSMV CHEM ANLYZR: CPT

## 2018-10-22 PROCEDURE — 81003 URINALYSIS AUTO W/O SCOPE: CPT | Mod: 59

## 2018-10-22 PROCEDURE — 80329 ANALGESICS NON-OPIOID 1 OR 2: CPT

## 2018-10-22 PROCEDURE — 36415 COLL VENOUS BLD VENIPUNCTURE: CPT

## 2018-10-22 PROCEDURE — 99285 EMERGENCY DEPT VISIT HI MDM: CPT

## 2018-10-22 PROCEDURE — 80053 COMPREHEN METABOLIC PANEL: CPT

## 2018-10-22 PROCEDURE — 84443 ASSAY THYROID STIM HORMONE: CPT

## 2018-10-22 NOTE — ED NOTES
[facility] received in [facility].  Awaiting lab results (UDS, UA) and medical clearance for psych placement.

## 2018-10-22 NOTE — ED NOTES
Admit packet faxed to [facility], [facility], [facility], and [facility].

## 2018-10-22 NOTE — ED NOTES
Pt states he is still unable to urinate. Explained catherization procedure to patient. Patient gives consent. Ok to catherize patient for urine sample per Dr. Valencia.

## 2018-10-22 NOTE — ED NOTES
In and Out catheter performed. Hand Hygiene completed. Sterile procedures followed. Specimen collected. Pt tolerated well.

## 2018-10-22 NOTE — ED NOTES
Belongings inventoried and secured in locker 29.     Belongings:  Black sweatpants  Tennis shoes  [employer] hat  Grey t shirt  Socks  Beige button up shirt  ID  $1 bill   Black and mild  Lighter  Police ticket   Guitar pick  Handful of change  Watch  Crumpled receipt and paper  Used black and mild

## 2018-10-22 NOTE — ED NOTES
Pt denies SI/HI or AV hallucinations. Reports taking 48 Coricidin OTC pills. Denies any CP/SOB/nausea or pain at this time.

## 2018-10-22 NOTE — ED PROVIDER NOTES
SCRIBE #1 NOTE: I, Corby Westbrook, am scribing for, and in the presence of, Chcuk Valencia MD. I have scribed the entire note.      History     Chief Complaint   Patient presents with    Psychiatric Evaluation     was caught stearling cold medicine from the store, takes cold medicine everyday was just released from psych facility for substances abuse.      Review of patient's allergies indicates:   Allergen Reactions    Risperdal [risperidone] Other (See Comments)     gynecomastica         History of Present Illness     HPI    10/22/2018, 11:42 AM  History obtained from the patient      History of Present Illness: Dimitrios Arzola is a 33 y.o. male patient with a PMHx including ADHD, EtOH abuse, depression, bipolar disorder, and psychosis who presents to the Emergency Department for a psychiatric evaluation. Pt was reportedly caught stealing cough medicine from the store and brought in after he claimed her had psychiatric issues. Pt was recently d/c from a psychiatric facility for substance abuse. Patient denies any HI, SI, IV drug use, EtOH use, hallucinations, sleep changes, and all other sxs at this time. No further complaints or concerns at this time.       Arrival mode: Police escort    PCP: Primary Doctor No        Past Medical History:  Past Medical History:   Diagnosis Date    ADHD (attention deficit hyperactivity disorder)     Alcohol abuse     Anxiety     Bipolar affective     Depression     History of psychiatric hospitalization     Hx of psychiatric care     Overdose 10/2018    ambien and chloracedin D    Psychiatric problem     Psychosis 10/18/2017    Therapy        Past Surgical History:  Past Surgical History:   Procedure Laterality Date    ANKLE SURGERY      right         Family History:  Family History   Family history unknown: Yes       Social History:  Social History     Tobacco Use    Smoking status: Current Every Day Smoker     Packs/day: 0.50     Types: Cigarettes     Smokeless tobacco: Never Used   Substance and Sexual Activity    Alcohol use: Yes     Comment: Vodka occasionally     Drug use: Yes     Types: Marijuana    Sexual activity: Yes     Partners: Female     Birth control/protection: None        Review of Systems     Review of Systems   Constitutional: Negative for activity change and fever.        (-) EtOH use  (-) IV drug use  (+) Ingestion of cold medicine pills   HENT: Negative for sore throat.    Respiratory: Negative for cough and shortness of breath.    Cardiovascular: Negative for chest pain.   Gastrointestinal: Negative for abdominal pain, nausea and vomiting.   Genitourinary: Negative for dysuria.   Musculoskeletal: Negative for back pain.   Skin: Negative for rash and wound.   Neurological: Negative for weakness and headaches.   Hematological: Does not bruise/bleed easily.   Psychiatric/Behavioral: Negative for agitation, confusion, hallucinations, self-injury, sleep disturbance and suicidal ideas.        (-) HI   All other systems reviewed and are negative.       Physical Exam     Initial Vitals [10/22/18 1134]   BP Pulse Resp Temp SpO2   (!) 159/98 102 20 100 °F (37.8 °C) 98 %      MAP       --          Physical Exam  Nursing Notes and Vital Signs Reviewed.  Constitutional: Patient is in no acute distress. Well-developed and well-nourished.  Head: Atraumatic. Normocephalic.  Eyes: PERRL. EOM intact. Conjunctivae are not pale. No scleral icterus.  ENT: Mucous membranes are moist.  Neck: Supple. Full ROM.   Cardiovascular: Regular rate. Regular rhythm.  Pulmonary/Chest: No respiratory distress. Clear to auscultation bilaterally. No wheezing or rales.  Abdominal: Soft and non-distended.    Musculoskeletal: Moves all extremities. No obvious deformities.   Skin: Warm and dry.  Neurological:  Alert, awake, and appropriate.  Normal speech.  No acute focal neurological deficits are appreciated.  Psychiatric:               Behavior: cooperative, eye contact  minimal              Mood and Affect: flat affect              Thought Process: Poor insight              Suicidal Ideations: No              Homicidal Ideations: No             Hallucinations: none       ED Course   Procedures  ED Vital Signs:  Vitals:    10/22/18 1134 10/22/18 1316   BP: (!) 159/98 (!) 141/97   Pulse: 102 98   Resp: 20    Temp: 100 °F (37.8 °C) 98.8 °F (37.1 °C)   TempSrc: Oral Oral   SpO2: 98% 99%   Weight: 111 kg (244 lb 11.4 oz)    Height: 6' (1.829 m)        Abnormal Lab Results:  Labs Reviewed   CBC W/ AUTO DIFFERENTIAL - Abnormal; Notable for the following components:       Result Value    MCH 32.2 (*)     Platelets 360 (*)     MPV 8.7 (*)     All other components within normal limits   COMPREHENSIVE METABOLIC PANEL - Abnormal; Notable for the following components:    CO2 22 (*)     Total Protein 8.6 (*)     AST 47 (*)     All other components within normal limits   SALICYLATE LEVEL - Abnormal; Notable for the following components:    Salicylate Lvl <5.0 (*)     All other components within normal limits   ACETAMINOPHEN LEVEL - Abnormal; Notable for the following components:    Acetaminophen (Tylenol), Serum <3.0 (*)     All other components within normal limits   TSH   ALCOHOL,MEDICAL (ETHANOL)   URINALYSIS, REFLEX TO URINE CULTURE   DRUG SCREEN PANEL, URINE EMERGENCY        All Lab Results:  Results for orders placed or performed during the hospital encounter of 10/22/18   CBC auto differential   Result Value Ref Range    WBC 8.77 3.90 - 12.70 K/uL    RBC 4.88 4.60 - 6.20 M/uL    Hemoglobin 15.7 14.0 - 18.0 g/dL    Hematocrit 45.2 40.0 - 54.0 %    MCV 93 82 - 98 fL    MCH 32.2 (H) 27.0 - 31.0 pg    MCHC 34.7 32.0 - 36.0 g/dL    RDW 12.8 11.5 - 14.5 %    Platelets 360 (H) 150 - 350 K/uL    MPV 8.7 (L) 9.2 - 12.9 fL    Gran # (ANC) 6.1 1.8 - 7.7 K/uL    Lymph # 1.6 1.0 - 4.8 K/uL    Mono # 1.0 0.3 - 1.0 K/uL    Eos # 0.0 0.0 - 0.5 K/uL    Baso # 0.02 0.00 - 0.20 K/uL    Gran% 69.4 38.0 - 73.0  %    Lymph% 18.7 18.0 - 48.0 %    Mono% 11.4 4.0 - 15.0 %    Eosinophil% 0.3 0.0 - 8.0 %    Basophil% 0.2 0.0 - 1.9 %    Differential Method Automated    Comprehensive metabolic panel   Result Value Ref Range    Sodium 139 136 - 145 mmol/L    Potassium 3.9 3.5 - 5.1 mmol/L    Chloride 103 95 - 110 mmol/L    CO2 22 (L) 23 - 29 mmol/L    Glucose 94 70 - 110 mg/dL    BUN, Bld 13 6 - 20 mg/dL    Creatinine 1.1 0.5 - 1.4 mg/dL    Calcium 9.8 8.7 - 10.5 mg/dL    Total Protein 8.6 (H) 6.0 - 8.4 g/dL    Albumin 4.9 3.5 - 5.2 g/dL    Total Bilirubin 0.5 0.1 - 1.0 mg/dL    Alkaline Phosphatase 58 55 - 135 U/L    AST 47 (H) 10 - 40 U/L    ALT 41 10 - 44 U/L    Anion Gap 14 8 - 16 mmol/L    eGFR if African American >60 >60 mL/min/1.73 m^2    eGFR if non African American >60 >60 mL/min/1.73 m^2   TSH   Result Value Ref Range    TSH 1.650 0.400 - 4.000 uIU/mL   Ethanol   Result Value Ref Range    Alcohol, Medical, Serum <10 <10 mg/dL   Salicylate level   Result Value Ref Range    Salicylate Lvl <5.0 (L) 15.0 - 30.0 mg/dL   Acetaminophen level   Result Value Ref Range    Acetaminophen (Tylenol), Serum <3.0 (L) 10.0 - 20.0 ug/mL            The Emergency Provider reviewed the vital signs and test results, which are outlined above.     ED Discussion     11:47 AM: The PEC hold has been issued by Dr. Valencia at this time for psychiatric evaluation.    12:21 PM: Charge nurse contacted police department to inquire as to why pt was brought in for a psychiatric facility. Pt reportedly took all 48 pills of the cough medicine before being caught.     12:30 PM: Contacted poison control. Narcan if necessary and monitoring of pt's vitals was recommended.     2:12 PM: Pt has been medically cleared by Dr. Valencia at this time. Reassessed pt at this time. Pt is resting comfortably and appears in no acute distress. There are no psychiatric services offered at this facility. D/w pt all pertinent ED information and plan to transfer to psychiatric  facility for psychiatric treatment. Pt verbalizes understanding. Patient being transferred by [facility] for ongoing personal protection en route. Pt will be transported by personnel trained in CPR and CPI. All questions and complaints have been addressed at this time. Pt condition is stable at this time and is clear to transfer to psychiatric facility at this time.     ED Medication(s):  Medications - No data to display           Medical Decision Making     Medical Decision Making:   Clinical Tests:   Lab Tests: Ordered and Reviewed             Scribe Attestation:   Scribe #1: I performed the above scribed service and the documentation accurately describes the services I performed. I attest to the accuracy of the note. 10/22/2018 11:42 AM    Attending:   Physician Attestation Statement for Scribe #1: I, Chuck Valencia MD, personally performed the services described in this documentation, as scribed by Corby Westbrook, in my presence, and it is both accurate and complete.           Clinical Impression       ICD-10-CM ICD-9-CM   1. Psychosis, unspecified psychosis type F29 298.9       Disposition:   Disposition: Transferred  Condition: Stable             Chuck Valencia MD  10/22/18 5201

## 2018-10-22 NOTE — ED NOTES
Pt calm and cooperative. Sitter at bedside. Respirations even and unlabored. No acute distress noted. No complaints at this time.

## 2018-10-22 NOTE — ED NOTES
"Pt informed of placement. Pt states "well it's going to get wild. Simpson in my arm and strapped down whenever I have to leave this place". Security notified.   "

## 2018-10-22 NOTE — CONSULTS
Tele-Consultation to Emergency Department from Psychiatry    Please see previous notes:    From current presentation:  Dimitrios Arzola is a 33 y.o. male patient with a PMHx including ADHD, EtOH abuse, depression, bipolar disorder, and psychosis who presents to the Emergency Department for a psychiatric evaluation. Pt was reportedly caught stealing cough medicine from the store and brought in after he claimed her had psychiatric issues. Pt was recently d/c from a psychiatric facility for substane abuse. Patient denies any HI, SI, IV drug use, EtOH use, hallucinations, sleep changes, and all other sxs at this time. No further complaints or concerns at this time.     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 Patient reported feelings of depression and SI to ED physician and admitted to overdose of ambien. On my evaluation he admits to feelings of depression including low mood, worthlessness, irritability, insomnia, and feeling hopeless. He reports having SI but states he is not sure if taking the Ambien was a suicide attempt. He does minimize SI when discussion of hospitalization came up but was consistent with reporting his feelings of depression despite compliance with medication. He is taking Depakote, Remeron ,Seroquel, and another medicine he cannot remember.       Patient agreeable to consultation via telepsychiatry.    Consultation started: 10/22/2018 at 1:50 pm.  The chief complaint leading to psychiatric consultation is: psychiatric issues  This consultation was requested by Dr. Chuck Valencia, the Emergency Department attending physician.  The location of the consulting psychiatrist is [address] [place] [address].  The patient location is [facility].    Patient Identification:  Dimitrios Arzola is a 33 y.o.  "male.    Patient information was obtained from patient.    History of Present Illness:  "I just want to go home". Stole cough medicine today. States, that he takes cough medicine regularly to get high.    Has not been taking any psychotropic medication in the last few days.    Pt. Prefers, that I not contact any source of collateral info at this time.    Psychiatric History:   Hospitalization: Yes  Medication Trials: yes  Suicide Attempts: yes    Review of Systems:  Denies any current physical complaint    Past Medical History:   Past Medical History:   Diagnosis Date    ADHD (attention deficit hyperactivity disorder)     Alcohol abuse     Anxiety     Bipolar affective     Depression     History of psychiatric hospitalization     Hx of psychiatric care     Overdose 10/2018    ambien and chloracedin D    Psychiatric problem     Psychosis 10/18/2017    Therapy      Seizures: "I don't think so"  Head trauma/l.o.c.: denies head trauma with l.o.c.    Allergies:   Review of patient's allergies indicates:   Allergen Reactions    Risperdal [risperidone] Other (See Comments)     gynecomastica       Medications in ER: Medications - No data to display    Substance Abuse History:   Alchohol: "I used to drink a lot", now drinks occasionally  Drug: cough syrup    Legal History:   Past charges/incarcerations: incarcerated 6 months[DWI]  Pending charges: stole cough syrup    Social History:   Housing Status: slept in woods last night    Current Evaluation:     Constitutional  Vitals:  Vitals:    10/22/18 1134 10/22/18 1316   BP: (!) 159/98 (!) 141/97   Pulse: 102 98   Resp: 20    Temp: 100 °F (37.8 °C) 98.8 °F (37.1 °C)   TempSrc: Oral Oral   SpO2: 98% 99%   Weight: 111 kg (244 lb 11.4 oz)    Height: 6' (1.829 m)       General:  unremarkable, age appropriate     Musculoskeletal  Muscle Strength/Tone:   moving arms normally   Gait & Station:   sitting on stretcher     Psychiatric  Level of Consciousness: " "alert  Orientation: oriented to person, place and time  Grooming: in hospital gown  Psychomotor Behavior: no agitation  Speech: normal in rate, rhythm and volume  Language: uses words appropriately  Mood: "I'm o.k."  Affect: constricted  Thought Process: goal directed  Associations: intact  Thought Content: denies SI/HI  Memory: grossly intact  Attention: intact to interview  Insight: appears limited  Judgement: appears limited    Relevant Elements of Neurological Exam: no abnormality of posture noted    Assessment - Diagnosis - Goals:     Diagnosis/Impression:   Depressive d/o, unspecified  Cough Medicine abuse    Based on currently available information pt. Appears gravely disabled. He reportedly attempted suicide earlier this month.    Case d/w ER MD Dr. Valencia.    Rec:   - medical clearance  - PEC and psychiatric hospitalization  - no standing psychotropic medication for now  - Zyprexa 5 mg p.o./i.m. q8h prn for agitation    Time with patient: 15 min    More than 50% of the time was spent counseling/coordinating care    Laboratory Data:   Labs Reviewed   CBC W/ AUTO DIFFERENTIAL - Abnormal; Notable for the following components:       Result Value    MCH 32.2 (*)     Platelets 360 (*)     MPV 8.7 (*)     All other components within normal limits   COMPREHENSIVE METABOLIC PANEL - Abnormal; Notable for the following components:    CO2 22 (*)     Total Protein 8.6 (*)     AST 47 (*)     All other components within normal limits   URINALYSIS, REFLEX TO URINE CULTURE   TSH   DRUG SCREEN PANEL, URINE EMERGENCY   ALCOHOL,MEDICAL (ETHANOL)   SALICYLATE LEVEL   ACETAMINOPHEN LEVEL           "

## 2018-10-22 NOTE — ED NOTES
Call to poison control- Recommend Basic labs, drug screen , tylenol level. Narcan if necessary (possible large dose). Monitor for seizure, agitation, tremors. SYmptomatic supportive care. Monitor kidney function and urine output. Monitor for rhabdo.

## 2018-10-22 NOTE — ED NOTES
Two police officers and charge nurse enter room. Pt is crying as he states he never wishes he was born. Additionally, he asks the deputy to shoot him or give him the gun to do it himself.

## 2018-10-22 NOTE — ED NOTES
Patient accepted by Tre[facility] at [facility] ([address] [place] [address]) for the service of Dr. Osorio.  Report to be called to [phone].

## 2018-10-22 NOTE — ED NOTES
Admit packet faxed to [facility], [facility], [facility], [facility], [facility], [facility], [facility], [facility], [facility], [facility], [facility], [facility], [facility], [facility], Waiting for response.

## 2018-11-13 ENCOUNTER — HOSPITAL ENCOUNTER (INPATIENT)
Facility: HOSPITAL | Age: 33
LOS: 2 days | Discharge: PSYCHIATRIC OR SUBSTANCE USE FACILITY | DRG: 918 | End: 2018-11-15
Attending: EMERGENCY MEDICINE | Admitting: INTERNAL MEDICINE
Payer: MEDICAID

## 2018-11-13 DIAGNOSIS — T50.902A INTENTIONAL OVERDOSE OF DRUG IN TABLET FORM: ICD-10-CM

## 2018-11-13 DIAGNOSIS — T14.91XA SUICIDE ATTEMPT: ICD-10-CM

## 2018-11-13 DIAGNOSIS — T50.901A OVERDOSE: ICD-10-CM

## 2018-11-13 DIAGNOSIS — T50.901A DRUG OVERDOSE: Primary | ICD-10-CM

## 2018-11-13 LAB
ALBUMIN SERPL BCP-MCNC: 3.8 G/DL
ALP SERPL-CCNC: 50 U/L
ALT SERPL W/O P-5'-P-CCNC: 30 U/L
ANION GAP SERPL CALC-SCNC: 13 MMOL/L
APTT BLDCRRT: 28.3 SEC
AST SERPL-CCNC: 26 U/L
BILIRUB SERPL-MCNC: 0.3 MG/DL
BUN SERPL-MCNC: 11 MG/DL
CALCIUM SERPL-MCNC: 9.8 MG/DL
CHLORIDE SERPL-SCNC: 103 MMOL/L
CO2 SERPL-SCNC: 24 MMOL/L
CREAT SERPL-MCNC: 1.1 MG/DL
EST. GFR  (AFRICAN AMERICAN): >60 ML/MIN/1.73 M^2
EST. GFR  (NON AFRICAN AMERICAN): >60 ML/MIN/1.73 M^2
ETHANOL SERPL-MCNC: <10 MG/DL
GLUCOSE SERPL-MCNC: 96 MG/DL
INR PPP: 0.9
POTASSIUM SERPL-SCNC: 4.3 MMOL/L
PROT SERPL-MCNC: 7.2 G/DL
PROTHROMBIN TIME: 10.2 SEC
SODIUM SERPL-SCNC: 140 MMOL/L
TROPONIN I SERPL DL<=0.01 NG/ML-MCNC: <0.006 NG/ML

## 2018-11-13 PROCEDURE — 82550 ASSAY OF CK (CPK): CPT

## 2018-11-13 PROCEDURE — 96361 HYDRATE IV INFUSION ADD-ON: CPT

## 2018-11-13 PROCEDURE — 80053 COMPREHEN METABOLIC PANEL: CPT

## 2018-11-13 PROCEDURE — 11000001 HC ACUTE MED/SURG PRIVATE ROOM

## 2018-11-13 PROCEDURE — 85610 PROTHROMBIN TIME: CPT

## 2018-11-13 PROCEDURE — 84100 ASSAY OF PHOSPHORUS: CPT

## 2018-11-13 PROCEDURE — 93005 ELECTROCARDIOGRAM TRACING: CPT

## 2018-11-13 PROCEDURE — 96375 TX/PRO/DX INJ NEW DRUG ADDON: CPT | Mod: 59

## 2018-11-13 PROCEDURE — 82553 CREATINE MB FRACTION: CPT

## 2018-11-13 PROCEDURE — 63600175 PHARM REV CODE 636 W HCPCS: Performed by: EMERGENCY MEDICINE

## 2018-11-13 PROCEDURE — 80164 ASSAY DIPROPYLACETIC ACD TOT: CPT

## 2018-11-13 PROCEDURE — 99291 CRITICAL CARE FIRST HOUR: CPT | Mod: 25

## 2018-11-13 PROCEDURE — 96376 TX/PRO/DX INJ SAME DRUG ADON: CPT

## 2018-11-13 PROCEDURE — 83735 ASSAY OF MAGNESIUM: CPT

## 2018-11-13 PROCEDURE — 80320 DRUG SCREEN QUANTALCOHOLS: CPT

## 2018-11-13 PROCEDURE — 82962 GLUCOSE BLOOD TEST: CPT

## 2018-11-13 PROCEDURE — 96366 THER/PROPH/DIAG IV INF ADDON: CPT

## 2018-11-13 PROCEDURE — 85025 COMPLETE CBC W/AUTO DIFF WBC: CPT

## 2018-11-13 PROCEDURE — 84484 ASSAY OF TROPONIN QUANT: CPT

## 2018-11-13 PROCEDURE — 93010 ELECTROCARDIOGRAM REPORT: CPT | Mod: ,,, | Performed by: INTERNAL MEDICINE

## 2018-11-13 PROCEDURE — 80329 ANALGESICS NON-OPIOID 1 OR 2: CPT

## 2018-11-13 PROCEDURE — 85730 THROMBOPLASTIN TIME PARTIAL: CPT

## 2018-11-13 PROCEDURE — 96365 THER/PROPH/DIAG IV INF INIT: CPT

## 2018-11-13 PROCEDURE — 80307 DRUG TEST PRSMV CHEM ANLYZR: CPT

## 2018-11-13 PROCEDURE — 25000003 PHARM REV CODE 250: Performed by: EMERGENCY MEDICINE

## 2018-11-13 RX ORDER — GABAPENTIN 100 MG/1
100 CAPSULE ORAL 3 TIMES DAILY
Status: ON HOLD | COMMUNITY
End: 2018-11-21 | Stop reason: HOSPADM

## 2018-11-13 RX ORDER — DIVALPROEX SODIUM 500 MG/1
500 TABLET, FILM COATED, EXTENDED RELEASE ORAL DAILY
Status: ON HOLD | COMMUNITY
End: 2018-11-21 | Stop reason: HOSPADM

## 2018-11-13 RX ORDER — FLUOXETINE HYDROCHLORIDE 40 MG/1
40 CAPSULE ORAL DAILY
Status: ON HOLD | COMMUNITY
End: 2018-11-21 | Stop reason: HOSPADM

## 2018-11-13 RX ADMIN — LORAZEPAM 2 MG: 2 INJECTION INTRAMUSCULAR; INTRAVENOUS at 11:11

## 2018-11-13 RX ADMIN — SODIUM CHLORIDE 1000 ML: 0.9 INJECTION, SOLUTION INTRAVENOUS at 11:11

## 2018-11-14 PROBLEM — R03.0 ELEVATED BP WITHOUT DIAGNOSIS OF HYPERTENSION: Status: ACTIVE | Noted: 2018-11-14

## 2018-11-14 PROBLEM — F19.20: Status: ACTIVE | Noted: 2018-11-14

## 2018-11-14 PROBLEM — F99 PSYCHIATRIC DISORDER: Status: ACTIVE | Noted: 2018-11-14

## 2018-11-14 PROBLEM — T50.902A INTENTIONAL OVERDOSE OF DRUG IN TABLET FORM: Status: ACTIVE | Noted: 2018-11-14

## 2018-11-14 PROBLEM — F25.0 SCHIZOAFFECTIVE DISORDER, BIPOLAR TYPE: Status: ACTIVE | Noted: 2018-11-14

## 2018-11-14 PROBLEM — T50.901A DRUG OVERDOSE: Status: ACTIVE | Noted: 2018-11-14

## 2018-11-14 LAB
ALBUMIN SERPL BCP-MCNC: 3.6 G/DL
ALBUMIN SERPL BCP-MCNC: 3.6 G/DL
ALLENS TEST: ABNORMAL
ALP SERPL-CCNC: 40 U/L
ALT SERPL W/O P-5'-P-CCNC: 28 U/L
AMPHET+METHAMPHET UR QL: NEGATIVE
ANION GAP SERPL CALC-SCNC: 10 MMOL/L
ANION GAP SERPL CALC-SCNC: 11 MMOL/L
ANION GAP SERPL CALC-SCNC: 12 MMOL/L
ANION GAP SERPL CALC-SCNC: 8 MMOL/L
ANISOCYTOSIS BLD QL SMEAR: SLIGHT
APAP SERPL-MCNC: <3 UG/ML
APTT BLDCRRT: 29.8 SEC
AST SERPL-CCNC: 24 U/L
BARBITURATES UR QL SCN>200 NG/ML: NEGATIVE
BASOPHILS # BLD AUTO: 0.02 K/UL
BASOPHILS # BLD AUTO: 0.02 K/UL
BASOPHILS NFR BLD: 0.3 %
BASOPHILS NFR BLD: 0.3 %
BENZODIAZ UR QL SCN>200 NG/ML: NEGATIVE
BILIRUB DIRECT SERPL-MCNC: <0.1 MG/DL
BILIRUB SERPL-MCNC: 0.2 MG/DL
BILIRUB UR QL STRIP: NEGATIVE
BUN SERPL-MCNC: 7 MG/DL
BUN SERPL-MCNC: 7 MG/DL
BUN SERPL-MCNC: 8 MG/DL
BUN SERPL-MCNC: 8 MG/DL
BUN SERPL-MCNC: 9 MG/DL
BUN SERPL-MCNC: 9 MG/DL
BZE UR QL SCN: NEGATIVE
CALCIUM SERPL-MCNC: 8.9 MG/DL
CALCIUM SERPL-MCNC: 9 MG/DL
CALCIUM SERPL-MCNC: 9.1 MG/DL
CALCIUM SERPL-MCNC: 9.1 MG/DL
CANNABINOIDS UR QL SCN: NEGATIVE
CHLORIDE SERPL-SCNC: 104 MMOL/L
CHLORIDE SERPL-SCNC: 105 MMOL/L
CHLORIDE SERPL-SCNC: 105 MMOL/L
CHOLEST SERPL-MCNC: 250 MG/DL
CHOLEST/HDLC SERPL: 8.6 {RATIO}
CK MB SERPL-MCNC: 1.3 NG/ML
CK MB SERPL-RTO: 1.2 %
CK SERPL-CCNC: 109 U/L
CLARITY UR: CLEAR
CO2 SERPL-SCNC: 22 MMOL/L
CO2 SERPL-SCNC: 24 MMOL/L
CO2 SERPL-SCNC: 24 MMOL/L
CO2 SERPL-SCNC: 25 MMOL/L
COLOR UR: YELLOW
CREAT SERPL-MCNC: 0.8 MG/DL
CREAT SERPL-MCNC: 0.9 MG/DL
CREAT UR-MCNC: 128.3 MG/DL
DELSYS: ABNORMAL
DIFFERENTIAL METHOD: ABNORMAL
DIFFERENTIAL METHOD: ABNORMAL
EOSINOPHIL # BLD AUTO: 0.1 K/UL
EOSINOPHIL # BLD AUTO: 0.2 K/UL
EOSINOPHIL NFR BLD: 1.8 %
EOSINOPHIL NFR BLD: 2 %
ERYTHROCYTE [DISTWIDTH] IN BLOOD BY AUTOMATED COUNT: 12.8 %
ERYTHROCYTE [DISTWIDTH] IN BLOOD BY AUTOMATED COUNT: 12.9 %
EST. GFR  (AFRICAN AMERICAN): >60 ML/MIN/1.73 M^2
EST. GFR  (NON AFRICAN AMERICAN): >60 ML/MIN/1.73 M^2
ESTIMATED AVG GLUCOSE: 88 MG/DL
FIO2: 21
GLUCOSE SERPL-MCNC: 86 MG/DL
GLUCOSE SERPL-MCNC: 89 MG/DL
GLUCOSE SERPL-MCNC: 91 MG/DL
GLUCOSE SERPL-MCNC: 91 MG/DL
GLUCOSE SERPL-MCNC: 95 MG/DL
GLUCOSE SERPL-MCNC: 98 MG/DL
GLUCOSE UR QL STRIP: NEGATIVE
HBA1C MFR BLD HPLC: 4.7 %
HCO3 UR-SCNC: 24.3 MMOL/L (ref 24–28)
HCT VFR BLD AUTO: 39.1 %
HCT VFR BLD AUTO: 41.9 %
HDLC SERPL-MCNC: 29 MG/DL
HDLC SERPL: 11.6 %
HGB BLD-MCNC: 13.3 G/DL
HGB BLD-MCNC: 14.4 G/DL
HGB UR QL STRIP: NEGATIVE
INR PPP: 0.9
KETONES UR QL STRIP: ABNORMAL
LDLC SERPL CALC-MCNC: ABNORMAL MG/DL
LEUKOCYTE ESTERASE UR QL STRIP: NEGATIVE
LYMPHOCYTES # BLD AUTO: 2.4 K/UL
LYMPHOCYTES # BLD AUTO: 2.8 K/UL
LYMPHOCYTES NFR BLD: 34.4 %
LYMPHOCYTES NFR BLD: 38.4 %
MAGNESIUM SERPL-MCNC: 2.2 MG/DL
MCH RBC QN AUTO: 31.6 PG
MCH RBC QN AUTO: 31.9 PG
MCHC RBC AUTO-ENTMCNC: 34 G/DL
MCHC RBC AUTO-ENTMCNC: 34.4 G/DL
MCV RBC AUTO: 93 FL
MCV RBC AUTO: 93 FL
METHADONE UR QL SCN>300 NG/ML: NEGATIVE
MODE: ABNORMAL
MONOCYTES # BLD AUTO: 0.7 K/UL
MONOCYTES # BLD AUTO: 0.7 K/UL
MONOCYTES NFR BLD: 9.3 %
MONOCYTES NFR BLD: 9.9 %
NEUTROPHILS # BLD AUTO: 3.6 K/UL
NEUTROPHILS # BLD AUTO: 3.9 K/UL
NEUTROPHILS NFR BLD: 49.9 %
NEUTROPHILS NFR BLD: 54.2 %
NITRITE UR QL STRIP: NEGATIVE
NONHDLC SERPL-MCNC: 221 MG/DL
OPIATES UR QL SCN: NEGATIVE
PCO2 BLDA: 40 MMHG (ref 35–45)
PCP UR QL SCN>25 NG/ML: NEGATIVE
PH SMN: 7.39 [PH] (ref 7.35–7.45)
PH UR STRIP: 7 [PH] (ref 5–8)
PHOSPHATE SERPL-MCNC: 3.1 MG/DL
PHOSPHATE SERPL-MCNC: 3.2 MG/DL
PLATELET # BLD AUTO: 225 K/UL
PLATELET # BLD AUTO: 240 K/UL
PLATELET BLD QL SMEAR: ABNORMAL
PMV BLD AUTO: 8.7 FL
PMV BLD AUTO: 8.9 FL
PO2 BLDA: 55 MMHG (ref 80–100)
POC BE: -1 MMOL/L
POC SATURATED O2: 88 % (ref 95–100)
POCT GLUCOSE: 82 MG/DL (ref 70–110)
POCT GLUCOSE: 98 MG/DL (ref 70–110)
POCT GLUCOSE: 98 MG/DL (ref 70–110)
POCT GLUCOSE: 99 MG/DL (ref 70–110)
POLYCHROMASIA BLD QL SMEAR: ABNORMAL
POTASSIUM SERPL-SCNC: 4.1 MMOL/L
POTASSIUM SERPL-SCNC: 4.1 MMOL/L
POTASSIUM SERPL-SCNC: 4.2 MMOL/L
POTASSIUM SERPL-SCNC: 4.2 MMOL/L
POTASSIUM SERPL-SCNC: 4.3 MMOL/L
POTASSIUM SERPL-SCNC: 4.5 MMOL/L
PROT SERPL-MCNC: 6.5 G/DL
PROT UR QL STRIP: NEGATIVE
PROTHROMBIN TIME: 10.3 SEC
RBC # BLD AUTO: 4.21 M/UL
RBC # BLD AUTO: 4.51 M/UL
SALICYLATES SERPL-MCNC: <5 MG/DL
SAMPLE: ABNORMAL
SITE: ABNORMAL
SODIUM SERPL-SCNC: 138 MMOL/L
SODIUM SERPL-SCNC: 138 MMOL/L
SODIUM SERPL-SCNC: 139 MMOL/L
SP GR UR STRIP: 1.02 (ref 1–1.03)
TOXICOLOGY INFORMATION: NORMAL
TRIGL SERPL-MCNC: 651 MG/DL
TSH SERPL DL<=0.005 MIU/L-ACNC: 1.71 UIU/ML
URN SPEC COLLECT METH UR: ABNORMAL
UROBILINOGEN UR STRIP-ACNC: NEGATIVE EU/DL
VALPROATE SERPL-MCNC: 51.1 UG/ML
WBC # BLD AUTO: 7.1 K/UL
WBC # BLD AUTO: 7.34 K/UL

## 2018-11-14 PROCEDURE — 11000001 HC ACUTE MED/SURG PRIVATE ROOM

## 2018-11-14 PROCEDURE — 85730 THROMBOPLASTIN TIME PARTIAL: CPT

## 2018-11-14 PROCEDURE — 81003 URINALYSIS AUTO W/O SCOPE: CPT | Mod: 59

## 2018-11-14 PROCEDURE — 82803 BLOOD GASES ANY COMBINATION: CPT

## 2018-11-14 PROCEDURE — 99900035 HC TECH TIME PER 15 MIN (STAT)

## 2018-11-14 PROCEDURE — 25000003 PHARM REV CODE 250: Performed by: NURSE PRACTITIONER

## 2018-11-14 PROCEDURE — 80048 BASIC METABOLIC PNL TOTAL CA: CPT | Mod: 91

## 2018-11-14 PROCEDURE — 82247 BILIRUBIN TOTAL: CPT

## 2018-11-14 PROCEDURE — 84155 ASSAY OF PROTEIN SERUM: CPT

## 2018-11-14 PROCEDURE — 80307 DRUG TEST PRSMV CHEM ANLYZR: CPT

## 2018-11-14 PROCEDURE — 80061 LIPID PANEL: CPT

## 2018-11-14 PROCEDURE — 63600175 PHARM REV CODE 636 W HCPCS: Performed by: NURSE PRACTITIONER

## 2018-11-14 PROCEDURE — 84075 ASSAY ALKALINE PHOSPHATASE: CPT

## 2018-11-14 PROCEDURE — 83036 HEMOGLOBIN GLYCOSYLATED A1C: CPT

## 2018-11-14 PROCEDURE — 85025 COMPLETE CBC W/AUTO DIFF WBC: CPT

## 2018-11-14 PROCEDURE — 80069 RENAL FUNCTION PANEL: CPT

## 2018-11-14 PROCEDURE — 36600 WITHDRAWAL OF ARTERIAL BLOOD: CPT

## 2018-11-14 PROCEDURE — 85610 PROTHROMBIN TIME: CPT

## 2018-11-14 PROCEDURE — 93010 ELECTROCARDIOGRAM REPORT: CPT | Mod: 76,,, | Performed by: INTERNAL MEDICINE

## 2018-11-14 PROCEDURE — 84443 ASSAY THYROID STIM HORMONE: CPT

## 2018-11-14 PROCEDURE — C9113 INJ PANTOPRAZOLE SODIUM, VIA: HCPCS | Performed by: NURSE PRACTITIONER

## 2018-11-14 PROCEDURE — 93010 ELECTROCARDIOGRAM REPORT: CPT | Mod: ,,, | Performed by: INTERNAL MEDICINE

## 2018-11-14 RX ORDER — DEXMEDETOMIDINE HYDROCHLORIDE 4 UG/ML
0.2 INJECTION, SOLUTION INTRAVENOUS CONTINUOUS
Status: DISCONTINUED | OUTPATIENT
Start: 2018-11-14 | End: 2018-11-14

## 2018-11-14 RX ORDER — ACETAMINOPHEN 325 MG/1
650 TABLET ORAL EVERY 8 HOURS PRN
Status: DISCONTINUED | OUTPATIENT
Start: 2018-11-14 | End: 2018-11-15 | Stop reason: HOSPADM

## 2018-11-14 RX ORDER — IBUPROFEN 200 MG
16 TABLET ORAL
Status: DISCONTINUED | OUTPATIENT
Start: 2018-11-14 | End: 2018-11-15 | Stop reason: HOSPADM

## 2018-11-14 RX ORDER — SODIUM CHLORIDE 0.9 % (FLUSH) 0.9 %
5 SYRINGE (ML) INJECTION
Status: DISCONTINUED | OUTPATIENT
Start: 2018-11-14 | End: 2018-11-15 | Stop reason: HOSPADM

## 2018-11-14 RX ORDER — PANTOPRAZOLE SODIUM 40 MG/10ML
40 INJECTION, POWDER, LYOPHILIZED, FOR SOLUTION INTRAVENOUS DAILY
Status: DISCONTINUED | OUTPATIENT
Start: 2018-11-14 | End: 2018-11-15 | Stop reason: HOSPADM

## 2018-11-14 RX ORDER — DIPHENHYDRAMINE HYDROCHLORIDE 50 MG/ML
25 INJECTION INTRAMUSCULAR; INTRAVENOUS ONCE
Status: COMPLETED | OUTPATIENT
Start: 2018-11-14 | End: 2018-11-14

## 2018-11-14 RX ORDER — FENTANYL CITRATE 50 UG/ML
50 INJECTION, SOLUTION INTRAMUSCULAR; INTRAVENOUS
Status: DISCONTINUED | OUTPATIENT
Start: 2018-11-14 | End: 2018-11-15 | Stop reason: HOSPADM

## 2018-11-14 RX ORDER — ONDANSETRON 2 MG/ML
4 INJECTION INTRAMUSCULAR; INTRAVENOUS EVERY 8 HOURS PRN
Status: DISCONTINUED | OUTPATIENT
Start: 2018-11-14 | End: 2018-11-15 | Stop reason: HOSPADM

## 2018-11-14 RX ORDER — ENOXAPARIN SODIUM 100 MG/ML
40 INJECTION SUBCUTANEOUS EVERY 24 HOURS
Status: DISCONTINUED | OUTPATIENT
Start: 2018-11-14 | End: 2018-11-15 | Stop reason: HOSPADM

## 2018-11-14 RX ORDER — GLUCAGON 1 MG
1 KIT INJECTION
Status: DISCONTINUED | OUTPATIENT
Start: 2018-11-14 | End: 2018-11-15 | Stop reason: HOSPADM

## 2018-11-14 RX ORDER — SODIUM CHLORIDE 9 MG/ML
INJECTION, SOLUTION INTRAVENOUS CONTINUOUS
Status: ACTIVE | OUTPATIENT
Start: 2018-11-14 | End: 2018-11-15

## 2018-11-14 RX ORDER — IBUPROFEN 200 MG
24 TABLET ORAL
Status: DISCONTINUED | OUTPATIENT
Start: 2018-11-14 | End: 2018-11-15 | Stop reason: HOSPADM

## 2018-11-14 RX ORDER — FENTANYL CITRATE 50 UG/ML
100 INJECTION, SOLUTION INTRAMUSCULAR; INTRAVENOUS ONCE
Status: COMPLETED | OUTPATIENT
Start: 2018-11-14 | End: 2018-11-14

## 2018-11-14 RX ADMIN — LORAZEPAM 1 MG: 2 INJECTION INTRAMUSCULAR; INTRAVENOUS at 02:11

## 2018-11-14 RX ADMIN — DEXMEDETOMIDINE HYDROCHLORIDE 0.2 MCG/KG/HR: 4 INJECTION, SOLUTION INTRAVENOUS at 10:11

## 2018-11-14 RX ADMIN — LORAZEPAM 2 MG: 2 INJECTION INTRAMUSCULAR; INTRAVENOUS at 09:11

## 2018-11-14 RX ADMIN — FENTANYL CITRATE 100 MCG: 50 INJECTION INTRAMUSCULAR; INTRAVENOUS at 10:11

## 2018-11-14 RX ADMIN — LORAZEPAM 2 MG: 2 INJECTION INTRAMUSCULAR; INTRAVENOUS at 04:11

## 2018-11-14 RX ADMIN — LORAZEPAM 2 MG: 2 INJECTION INTRAMUSCULAR; INTRAVENOUS at 08:11

## 2018-11-14 RX ADMIN — SODIUM CHLORIDE 1000 ML: 0.9 INJECTION, SOLUTION INTRAVENOUS at 01:11

## 2018-11-14 RX ADMIN — SODIUM CHLORIDE: 0.9 INJECTION, SOLUTION INTRAVENOUS at 02:11

## 2018-11-14 RX ADMIN — LORAZEPAM 2 MG: 2 INJECTION INTRAMUSCULAR; INTRAVENOUS at 06:11

## 2018-11-14 RX ADMIN — LORAZEPAM 1 MG: 2 INJECTION INTRAMUSCULAR; INTRAVENOUS at 03:11

## 2018-11-14 RX ADMIN — ENOXAPARIN SODIUM 40 MG: 100 INJECTION SUBCUTANEOUS at 04:11

## 2018-11-14 RX ADMIN — LORAZEPAM 2 MG: 2 INJECTION INTRAMUSCULAR; INTRAVENOUS at 10:11

## 2018-11-14 RX ADMIN — PANTOPRAZOLE SODIUM 40 MG: 40 INJECTION, POWDER, FOR SOLUTION INTRAVENOUS at 09:11

## 2018-11-14 RX ADMIN — SODIUM CHLORIDE: 0.9 INJECTION, SOLUTION INTRAVENOUS at 09:11

## 2018-11-14 RX ADMIN — DIPHENHYDRAMINE HYDROCHLORIDE 25 MG: 50 INJECTION, SOLUTION INTRAMUSCULAR; INTRAVENOUS at 04:11

## 2018-11-14 RX ADMIN — FENTANYL CITRATE 50 MCG: 50 INJECTION INTRAMUSCULAR; INTRAVENOUS at 09:11

## 2018-11-14 NOTE — ED NOTES
POC reviewed with pt at this time, pt unable to acknoweldge understanding. Pt disoriented to place/time/situation. Pt rambling about cookies, dogs, grandma. Pt in 4 point soft restraints, skin intact, no redness or breakdown noted. IV infusing per MAR. Syed in place, syed care performed. Pt remains tachycardic, pt afebrile, other VSS. RN at bedside, bed in lowest position. Pt denies chest pain, SOB, nausea, pain. WCTM.

## 2018-11-14 NOTE — ED NOTES
"Pt states that there is a snake on the roof and is yelling for someone to "blow it up." Repeatedly attempting to take off his restraints and get out of bed. Pt unable to follow commands. Remains disoriented x 2. Oriented to self. Opens eyes spontaneously. RN and student at bedside, bed in lowest position, WCTM.      "

## 2018-11-14 NOTE — ED NOTES
"Pt's status remains unchanged. Pt has removed pulse ox 3 times. 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 Pt appears agitated when RN attempts to fix equipment. Pt redirected and reoriented on situation. Lights remain dimmed to decrease stimuli. Unable to take pt to CT due to status. Will continue to monitor.   "

## 2018-11-14 NOTE — ED NOTES
P'ts belongings placed in [address] [address] include:  Grey shirt, grey shorts, watch, sandals, divalproex bottle, fluoxetine bottle, gabapentin bottle, and Seroquel bottle.

## 2018-11-14 NOTE — ASSESSMENT & PLAN NOTE
Check CT head, ABG and TSH given overdose to complete basic organic workup  Patient with prior hx of bipolar and SI attempt in past. Per nurse patient has been having delusions and hallucinations earlier in Er visit.    Psych consult   Hold antipsychotics at this time. Ativan as need  Will check depakote level continue pending course

## 2018-11-14 NOTE — HOSPITAL COURSE
11/14/18 Called to the ER with concerns for increasing agitation. Started a precedex gtt. The case was discussed with Dr. Le (Pulmonology). Precedex was stopped for concerns of QT prolongation given the patients overdose on Seroquel. Will increase Ativan and downgrade to telemetry. The patient will need a 1:1 sitter. 11/15/18 The patient is hemodynamically stable today. He is currently oriented x 3 and denies any current suicidal or homicidal ideations. The patient is medically cleared for psych placement. The patient was seen and examined today and deemed stable for discharge. The patient was accepted to [facility] psych facility and will be transferred there once transportation is arranged.

## 2018-11-14 NOTE — ED NOTES
Pt remains restless, agitated, and AMS. Attempting to remove restraints and get out of bed. Pt redirected. Educated on importance of restraints. Lights dimmed. Hospital medicine secure chatted and informed that we are unable to take pt to CT at this time.

## 2018-11-14 NOTE — ASSESSMENT & PLAN NOTE
Serial EKG, BMP  Monitor RFP/LFTs  Hour neuro checks  Airway monitoring  Seizure precauations   Supportive care while clearance of medication. Recommendations by poison control noted.   Hold antipsychotics  Tele Psych consult  PEC'd

## 2018-11-14 NOTE — ED NOTES
NP Ourso at bedside. Aware of pt's current status. States to administer benadryl and continue to CT.

## 2018-11-14 NOTE — ED NOTES
NP Ourose notified that pt is unable to tolerate CT due to restlessness, agitation, and altered mental status. NP states to states to administer Ativan 1 mg. Awaiting orders.

## 2018-11-14 NOTE — ED NOTES
MD Ghosh notified that pt repeatedly attempting to get out of bed, remove IV and medical equipment. Pt requesting to go to parking lot. Pt escorted back to bed using CPI technique. Pt reassured. Charge nurse Kevin at bedside. Awaiting new orders.

## 2018-11-14 NOTE — ED NOTES
"Pt attempting to get out of bed at this time, sitting up and pushing self towards the end of the bed. Pt redirected to lay down. Pt began to yell at this time, "I'm your master, you're not my master, you're my fucking servant."  OC and security called to bedside. Hospital medicine called to bedside. WCTM  "

## 2018-11-14 NOTE — SUBJECTIVE & OBJECTIVE
Past Medical History:   Diagnosis Date    ADHD (attention deficit hyperactivity disorder)     Alcohol abuse     Anxiety     Bipolar affective     Depression     History of psychiatric hospitalization     Hx of psychiatric care     Overdose 10/2018    ambien and chloracedin D    Psychiatric problem     Psychosis 10/18/2017    Therapy        Past Surgical History:   Procedure Laterality Date    ANKLE SURGERY      right       Review of patient's allergies indicates:   Allergen Reactions    Risperdal [risperidone] Other (See Comments)     gynecomastica       No current facility-administered medications on file prior to encounter.      Current Outpatient Medications on File Prior to Encounter   Medication Sig    divalproex ER (DEPAKOTE) 500 MG Tb24 Take 500 mg by mouth once daily.    FLUoxetine (PROZAC) 40 MG capsule Take 40 mg by mouth once daily.    gabapentin (NEURONTIN) 100 MG capsule Take 100 mg by mouth 3 (three) times daily.    quetiapine fumarate (SEROQUEL ORAL) Take by mouth.    ACETAMINOPHEN/CHLORPHENIRAMINE (CORICIDIN HBP COLD AND FLU ORAL) Take by mouth.    hydroxyzine pamoate (VISTARIL ORAL) Take by mouth.     Family History     Family history is unknown by patient.        Tobacco Use    Smoking status: Current Every Day Smoker     Packs/day: 0.50     Types: Cigarettes    Smokeless tobacco: Never Used   Substance and Sexual Activity    Alcohol use: Yes     Comment: Vodka occasionally     Drug use: Yes     Types: Marijuana    Sexual activity: Yes     Partners: Female     Birth control/protection: None     Review of Systems   Unable to perform ROS: Mental status change     Objective:     Vital Signs (Most Recent):  Temp: 97.8 °F (36.6 °C) (11/13/18 2258)  Pulse: (!) 125 (11/14/18 0023)  Resp: (!) 22 (11/14/18 0023)  BP: 110/84 (11/14/18 0023)  SpO2: 97 % (11/14/18 0023) Vital Signs (24h Range):  Temp:  [97.8 °F (36.6 °C)] 97.8 °F (36.6 °C)  Pulse:  [125-136] 125  Resp:  [22] 22  SpO2:   [96 %-97 %] 97 %  BP: (110-117)/(81-84) 110/84     Weight: 117.4 kg (258 lb 14.4 oz)  Body mass index is 34.16 kg/m².    Physical Exam   Constitutional: He appears well-developed. No distress.   Disheveled, unkept     HENT:   Head: Normocephalic and atraumatic.   Nose: Nose normal.   Eyes: Conjunctivae and EOM are normal. Pupils are equal, round, and reactive to light. No scleral icterus.   Neck: Normal range of motion. Neck supple. No tracheal deviation present.   Cardiovascular: Regular rhythm, normal heart sounds and intact distal pulses.   No murmur heard.  tachy   Pulmonary/Chest: Effort normal and breath sounds normal. No stridor. No respiratory distress. He has no wheezes. He has no rales.   Abdominal: Soft. Bowel sounds are normal. He exhibits no distension. There is no tenderness. There is no guarding.   Genitourinary:   Genitourinary Comments: ua neg   Musculoskeletal: Normal range of motion. He exhibits no edema or deformity.   Neurological: No cranial nerve deficit.   Easy to awaken and appears hyperstimuliated/startled with continued stimuli to stay wake. incomprehensable speech and  quickly falls back to sleep without stimuli;lethargic   Skin: Skin is warm and dry. Capillary refill takes less than 2 seconds. No rash noted. He is not diaphoretic.   Psychiatric:   PEC'd  Unable to fully assess   Hyperstimulated/startled response with attempting to keep awake.    Nursing note and vitals reviewed.        CRANIAL NERVES     CN III, IV, VI   Pupils are equal, round, and reactive to light.  Extraocular motions are normal.        Significant Labs: All pertinent labs within the past 24 hours have been reviewed.  Results for orders placed or performed during the hospital encounter of 11/13/18   CBC auto differential   Result Value Ref Range    WBC 7.34 3.90 - 12.70 K/uL    RBC 4.51 (L) 4.60 - 6.20 M/uL    Hemoglobin 14.4 14.0 - 18.0 g/dL    Hematocrit 41.9 40.0 - 54.0 %    MCV 93 82 - 98 fL    MCH 31.9 (H) 27.0  - 31.0 pg    MCHC 34.4 32.0 - 36.0 g/dL    RDW 12.8 11.5 - 14.5 %    Platelets 240 150 - 350 K/uL    MPV 8.7 (L) 9.2 - 12.9 fL    Gran # (ANC) 3.6 1.8 - 7.7 K/uL    Lymph # 2.8 1.0 - 4.8 K/uL    Mono # 0.7 0.3 - 1.0 K/uL    Eos # 0.2 0.0 - 0.5 K/uL    Baso # 0.02 0.00 - 0.20 K/uL    Gran% 49.9 38.0 - 73.0 %    Lymph% 38.4 18.0 - 48.0 %    Mono% 9.9 4.0 - 15.0 %    Eosinophil% 2.0 0.0 - 8.0 %    Basophil% 0.3 0.0 - 1.9 %    Differential Method Automated    Comprehensive metabolic panel   Result Value Ref Range    Sodium 140 136 - 145 mmol/L    Potassium 4.3 3.5 - 5.1 mmol/L    Chloride 103 95 - 110 mmol/L    CO2 24 23 - 29 mmol/L    Glucose 96 70 - 110 mg/dL    BUN, Bld 11 6 - 20 mg/dL    Creatinine 1.1 0.5 - 1.4 mg/dL    Calcium 9.8 8.7 - 10.5 mg/dL    Total Protein 7.2 6.0 - 8.4 g/dL    Albumin 3.8 3.5 - 5.2 g/dL    Total Bilirubin 0.3 0.1 - 1.0 mg/dL    Alkaline Phosphatase 50 (L) 55 - 135 U/L    AST 26 10 - 40 U/L    ALT 30 10 - 44 U/L    Anion Gap 13 8 - 16 mmol/L    eGFR if African American >60 >60 mL/min/1.73 m^2    eGFR if non African American >60 >60 mL/min/1.73 m^2   Ethanol   Result Value Ref Range    Alcohol, Medical, Serum <10 <10 mg/dL   Drug screen panel, emergency   Result Value Ref Range    Benzodiazepines Negative     Methadone metabolites Negative     Cocaine (Metab.) Negative     Opiate Scrn, Ur Negative     Barbiturate Screen, Ur Negative     Amphetamine Screen, Ur Negative     THC Negative     Phencyclidine Negative     Creatinine, Random Ur 128.3 23.0 - 375.0 mg/dL    Toxicology Information SEE COMMENT    Urinalysis   Result Value Ref Range    Specimen UA Urine, Catheterized     Color, UA Yellow Yellow, Straw, Geovanna    Appearance, UA Clear Clear    pH, UA 7.0 5.0 - 8.0    Specific Gravity, UA 1.025 1.005 - 1.030    Protein, UA Negative Negative    Glucose, UA Negative Negative    Ketones, UA Trace (A) Negative    Bilirubin (UA) Negative Negative    Occult Blood UA Negative Negative    Nitrite,  UA Negative Negative    Urobilinogen, UA Negative <2.0 EU/dL    Leukocytes, UA Negative Negative   Troponin I   Result Value Ref Range    Troponin I <0.006 0.000 - 0.026 ng/mL   Protime-INR   Result Value Ref Range    Prothrombin Time 10.2 9.0 - 12.5 sec    INR 0.9 0.8 - 1.2   APTT   Result Value Ref Range    aPTT 28.3 21.0 - 32.0 sec   Salicylate level   Result Value Ref Range    Salicylate Lvl <5.0 (L) 15.0 - 30.0 mg/dL   Acetaminophen level   Result Value Ref Range    Acetaminophen (Tylenol), Serum <3.0 (L) 10.0 - 20.0 ug/mL       Significant Imaging: I have reviewed all pertinent imaging results/findings within the past 24 hours.   Imaging Results          X-Ray Chest 1 View (Final result)  Result time 11/13/18 23:39:57    Final result by Alvino Chapa MD (11/13/18 23:39:57)                 Impression:      No acute cardiopulmonary disease.      Electronically signed by: Alvino Chapa MD  Date:    11/13/2018  Time:    23:39             Narrative:    EXAMINATION:  XR CHEST 1 VIEW    CLINICAL HISTORY:  drug overdose;    COMPARISON:  None.    FINDINGS:  The lungs are clear. The cardiac silhouette is within normal limits. The bones are intact.

## 2018-11-14 NOTE — H&P
[facility] Hospital Medicine  History & Physical    Patient Name: Dimitrios Arzola  MRN: [id number]  Admission Date: 11/13/2018  Attending Physician: Caden Pryor MD  Primary Care Provider: Primary Doctor No         Patient information was obtained from patient, past medical records and ER records.     Subjective:     Principal Problem:Intentional overdose of drug in tablet form    Chief Complaint:   Chief Complaint   Patient presents with    Drug Overdose     Pt took an unknown amount of Seroquel 100 mg. Intentional OD. Bottled filled on 11/8/18 w/ quantity of 90. Bottle found empty. Found at 2115 tonight, AMS.        HPI: HPI limited due to patient condition, AMS and obtained from ER staff and records, no family present at this time. Patient has been PEC'd by ER provider. Dimitrios Arzola is a 33 y.o. male patient with PMHx of anxiety, bipolar affective, depression, psychosis, and overdose who presents for intentional drug overdose. Was reported that family found patient sleeping on the couch at 2115 and woke him up, was incoherent/confused. Reported bottle of 90 Seroquel filled on 11/8/18 and bottle is empty. Unsure how many pills were taken by patient. Patient was evalauted in the Er, given IV fluids and ativan .Poison control was called, states to do a UDS, tylenol, salicylate, ETOH, CPK, CMP levels, EKG, seizure precautions, continous cardiac monitoring, CNS depression, watch for QTC prolongation on EKG, urinary retention, hypotension, and give benzo's for agitation. HM consulted. EKG in er reviewed-->Tachycardia, QRS stable, patient remains tachycardic and is lethargic on exam. Patient maintaining airway at this time.  Patient admitted to ICU for close monitoring.       Past Medical History:   Diagnosis Date    ADHD (attention deficit hyperactivity disorder)     Alcohol abuse     Anxiety     Bipolar affective     Depression     History of psychiatric hospitalization     Hx of  psychiatric care     Overdose 10/2018    ambien and chloracedin D    Psychiatric problem     Psychosis 10/18/2017    Therapy        Past Surgical History:   Procedure Laterality Date    ANKLE SURGERY      right       Review of patient's allergies indicates:   Allergen Reactions    Risperdal [risperidone] Other (See Comments)     gynecomastica       No current facility-administered medications on file prior to encounter.      Current Outpatient Medications on File Prior to Encounter   Medication Sig    divalproex ER (DEPAKOTE) 500 MG Tb24 Take 500 mg by mouth once daily.    FLUoxetine (PROZAC) 40 MG capsule Take 40 mg by mouth once daily.    gabapentin (NEURONTIN) 100 MG capsule Take 100 mg by mouth 3 (three) times daily.    quetiapine fumarate (SEROQUEL ORAL) Take by mouth.    ACETAMINOPHEN/CHLORPHENIRAMINE (CORICIDIN HBP COLD AND FLU ORAL) Take by mouth.    hydroxyzine pamoate (VISTARIL ORAL) Take by mouth.     Family History     Reviewed and not Pertinent           Tobacco Use    Smoking status: Current Every Day Smoker     Packs/day: 0.50     Types: Cigarettes    Smokeless tobacco: Never Used   Substance and Sexual Activity    Alcohol use: Yes     Comment: Vodka occasionally     Drug use: Yes     Types: Marijuana    Sexual activity: Yes     Partners: Female     Birth control/protection: None     *I have personally reviewed the patients allergies, medical, surgical, family and social history as listed above; patient not able to confirm due to AMS.    Review of Systems   Unable to perform ROS: Mental status change     Objective:     Vital Signs (Most Recent):  Temp: 97.8 °F (36.6 °C) (11/13/18 2258)  Pulse: (!) 125 (11/14/18 0023)  Resp: (!) 22 (11/14/18 0023)  BP: 110/84 (11/14/18 0023)  SpO2: 97 % (11/14/18 0023) Vital Signs (24h Range):  Temp:  [97.8 °F (36.6 °C)] 97.8 °F (36.6 °C)  Pulse:  [125-136] 125  Resp:  [22] 22  SpO2:  [96 %-97 %] 97 %  BP: (110-117)/(81-84) 110/84     Weight: 117.4 kg  (258 lb 14.4 oz)  Body mass index is 34.16 kg/m².    Physical Exam   Constitutional: He appears well-developed. No distress.   Disheveled, unkept     HENT:   Head: Normocephalic and atraumatic.   Nose: Nose normal.   Eyes: Conjunctivae and EOM are normal. Pupils are equal, round, and reactive to light. No scleral icterus.   Neck: Normal range of motion. Neck supple. No tracheal deviation present.   Cardiovascular: Regular rhythm, normal heart sounds and intact distal pulses.   No murmur heard.  tachy   Pulmonary/Chest: Effort normal and breath sounds normal. No stridor. No respiratory distress. He has no wheezes. He has no rales.   Abdominal: Soft. Bowel sounds are normal. He exhibits no distension. There is no tenderness. There is no guarding.   Genitourinary:   Genitourinary Comments: ua neg   Musculoskeletal: Normal range of motion. He exhibits no edema or deformity.   Neurological: Easy to awaken and appears hyperstimuliated/startled with continued stimuli to stay wake. incomprehensable speech and  quickly falls back to sleep without stimuli;lethargic. Not able to fully examine.  Skin: Skin is warm and dry. Capillary refill takes less than 2 seconds. No rash noted. He is not diaphoretic.   Psychiatric:   PEC'd  Unable to fully assess   Hyperstimulated/startled response with attempting to keep awake.    Nursing note and vitals reviewed.        CRANIAL NERVES     CN III, IV, VI   Pupils are equal, round, and reactive to light.  Extraocular motions are normal.        Significant Labs: All pertinent labs within the past 24 hours have been reviewed.  Results for orders placed or performed during the hospital encounter of 11/13/18   CBC auto differential   Result Value Ref Range    WBC 7.34 3.90 - 12.70 K/uL    RBC 4.51 (L) 4.60 - 6.20 M/uL    Hemoglobin 14.4 14.0 - 18.0 g/dL    Hematocrit 41.9 40.0 - 54.0 %    MCV 93 82 - 98 fL    MCH 31.9 (H) 27.0 - 31.0 pg    MCHC 34.4 32.0 - 36.0 g/dL    RDW 12.8 11.5 - 14.5 %     Platelets 240 150 - 350 K/uL    MPV 8.7 (L) 9.2 - 12.9 fL    Gran # (ANC) 3.6 1.8 - 7.7 K/uL    Lymph # 2.8 1.0 - 4.8 K/uL    Mono # 0.7 0.3 - 1.0 K/uL    Eos # 0.2 0.0 - 0.5 K/uL    Baso # 0.02 0.00 - 0.20 K/uL    Gran% 49.9 38.0 - 73.0 %    Lymph% 38.4 18.0 - 48.0 %    Mono% 9.9 4.0 - 15.0 %    Eosinophil% 2.0 0.0 - 8.0 %    Basophil% 0.3 0.0 - 1.9 %    Differential Method Automated    Comprehensive metabolic panel   Result Value Ref Range    Sodium 140 136 - 145 mmol/L    Potassium 4.3 3.5 - 5.1 mmol/L    Chloride 103 95 - 110 mmol/L    CO2 24 23 - 29 mmol/L    Glucose 96 70 - 110 mg/dL    BUN, Bld 11 6 - 20 mg/dL    Creatinine 1.1 0.5 - 1.4 mg/dL    Calcium 9.8 8.7 - 10.5 mg/dL    Total Protein 7.2 6.0 - 8.4 g/dL    Albumin 3.8 3.5 - 5.2 g/dL    Total Bilirubin 0.3 0.1 - 1.0 mg/dL    Alkaline Phosphatase 50 (L) 55 - 135 U/L    AST 26 10 - 40 U/L    ALT 30 10 - 44 U/L    Anion Gap 13 8 - 16 mmol/L    eGFR if African American >60 >60 mL/min/1.73 m^2    eGFR if non African American >60 >60 mL/min/1.73 m^2   Ethanol   Result Value Ref Range    Alcohol, Medical, Serum <10 <10 mg/dL   Drug screen panel, emergency   Result Value Ref Range    Benzodiazepines Negative     Methadone metabolites Negative     Cocaine (Metab.) Negative     Opiate Scrn, Ur Negative     Barbiturate Screen, Ur Negative     Amphetamine Screen, Ur Negative     THC Negative     Phencyclidine Negative     Creatinine, Random Ur 128.3 23.0 - 375.0 mg/dL    Toxicology Information SEE COMMENT    Urinalysis   Result Value Ref Range    Specimen UA Urine, Catheterized     Color, UA Yellow Yellow, Straw, Geovanna    Appearance, UA Clear Clear    pH, UA 7.0 5.0 - 8.0    Specific Gravity, UA 1.025 1.005 - 1.030    Protein, UA Negative Negative    Glucose, UA Negative Negative    Ketones, UA Trace (A) Negative    Bilirubin (UA) Negative Negative    Occult Blood UA Negative Negative    Nitrite, UA Negative Negative    Urobilinogen, UA Negative <2.0 EU/dL     Leukocytes, UA Negative Negative   Troponin I   Result Value Ref Range    Troponin I <0.006 0.000 - 0.026 ng/mL   Protime-INR   Result Value Ref Range    Prothrombin Time 10.2 9.0 - 12.5 sec    INR 0.9 0.8 - 1.2   APTT   Result Value Ref Range    aPTT 28.3 21.0 - 32.0 sec   Salicylate level   Result Value Ref Range    Salicylate Lvl <5.0 (L) 15.0 - 30.0 mg/dL   Acetaminophen level   Result Value Ref Range    Acetaminophen (Tylenol), Serum <3.0 (L) 10.0 - 20.0 ug/mL       Significant Imaging: I have reviewed all pertinent imaging results/findings within the past 24 hours.   Imaging Results          X-Ray Chest 1 View (Final result)  Result time 11/13/18 23:39:57    Final result by Alvino Chapa MD (11/13/18 23:39:57)                 Impression:      No acute cardiopulmonary disease.      Electronically signed by: Alvino Chapa MD  Date:    11/13/2018  Time:    23:39             Narrative:    EXAMINATION:  XR CHEST 1 VIEW    CLINICAL HISTORY:  drug overdose;    COMPARISON:  None.    FINDINGS:  The lungs are clear. The cardiac silhouette is within normal limits. The bones are intact.                              I have personally reviewed the patients labs, imaging, ekg (ST) and discussed the patient case in detail with the Er provider      Assessment/Plan:     * Intentional overdose of drug in tablet form    Serial EKG, BMP  Monitor RFP/LFTs  Hour neuro checks  Airway monitoring  Seizure precauations   Supportive care while clearance of medication. Recommendations by poison control noted.   Hold antipsychotics  Tele Psych consult  PEC'd         Additional MDM:   Psych: Evaluation: Physician Emergency Certificate (PEC) was done prior to arrival in the ED. A Physician Emergency Certificate (PEC) was done in the ED for: Suicidal. The patient was not cleared medically. Outcome: The patient was admitted to Internal Medicine. Tele Psych Consult placed for further recommendations/assistance which are appreciated in advance.             Schizoaffective disorder, bipolar type with psychotic features    Check CT head, ABG and TSH given overdose to complete basic organic workup  Patient with prior hx of bipolar and SI attempt in past. Per nurse patient has been having delusions and hallucinations earlier in Er visit.    Psych consult   Hold antipsychotics at this time. Ativan as need  Will check depakote level continue pending course     Elevated BP without diagnosis of hypertension    Monitor trends  Continue daily therapy pending course       Hyperlipidemia    Check lipids, a1c,   Consider daily therapy pending course          VTE Risk Mitigation (From admission, onward)        Ordered     enoxaparin injection 40 mg  Daily      11/14/18 0102     IP VTE HIGH RISK PATIENT  Once      11/14/18 0102     Place sequential compression device  Until discontinued      11/14/18 0102     Place TRELL hose  Until discontinued      11/14/18 0102          Total critical care time spent on case: 35 mins                Yamil Nunez NP  Department of Hospital Medicine   [facility]

## 2018-11-14 NOTE — ASSESSMENT & PLAN NOTE
Serial EKG, BMP  Monitor RFP/LFTs  Hour neuro checks  Airway monitoring  Seizure precauations   Supportive care while clearance of medication. Recommendations by poison control noted.   Hold antipsychotics  Tele Psych consult  PEC'd   11/14/18 Called to the ER with concerns for increasing agitation. Started a precedex gtt. The case was discussed with Dr. Le (Pulmonology). Precedex was stopped for concerns of QT prolongation given the patients overdose on Seroquel. Will increase Ativan and downgrade to telemetry. The patient will need a 1:1 sitter.

## 2018-11-14 NOTE — ED NOTES
Pt remains agitated post ativan administration. Pt continuous to have auditory and visual hallucinations. Unable to follow commands. Pt redirected. Notified of syed cathter placement. Speech is incomprehensible. Pt easily falls asleep during talking.

## 2018-11-14 NOTE — HPI
HPI limited due to patient condition, AMS and obtained from ER staff and records, no family present at this time. Patient has been PEC'd by ER provider. Dimitrios Arzola is a 33 y.o. male patient with PMHx of anxiety, bipolar affective, depression, psychosis, and overdose who presents for intentional drug overdose. Was reported that family found patient sleeping on the couch at 2115 and woke him up, was incoherent/confused. Reported bottle of 90 Seroquel filled on 11/8/18 and bottle is empty. Unsure how many pills were taken by patient. Patient was evalauted in the Er, given IV fluids and ativan .Poison control was called, states to do a UDS, tylenol, salicylate, ETOH, CPK, CMP levels, EKG, seizure precautions, continous cardiac monitoring, CNS depression, watch for QTC prolongation on EKG, urinary retention, hypotension, and give benzo's for agitation. HM consulted. EKG in er reviewed-->Tachycardia, QRS stable, patient remains tachycardic and is lethargic on exam. Patient maintaining airway at this time.  Patient admitted to ICU for close monitoring.

## 2018-11-14 NOTE — ED NOTES
"Pt states that he is "blowing out [his] lighter" at this time. Repeatedly attempting to take off his restraints. Unable to follow commands. Remains disoriented x 2. Oriented to self. Opens eyes spontaneously. Will continue to monitor.  "

## 2018-11-14 NOTE — ED NOTES
"Pt continues to be verbally aggressive, 2mg IV ativan given per NP orders. Security at bedside. Pt observed squeezing stretcher rail, pulling at restraints and pulse ox. Pt redirected that he is in the hospital. Pt yelling "I'm trying to get food out (of stretcher rail), go eat shit! You're a fucking liar! If you had a fucking ilir I would beat the fuck out of you!" Security remains at bedside. Orders for precedex acknowledged and to be followed out. WCTM.  "

## 2018-11-14 NOTE — ED NOTES
Pt's status remains unchanged. Remains altered mental status. NAD noted. Pt remains attached to cardiac monitor. Respiratory at bedside for ABG. Will continue to monitor.

## 2018-11-14 NOTE — ED NOTES
Poison control called, states to do a UDS, tylenol, salicylate, ETOH, CPK, CMP levels, EKG, seizure precautions, continous cardiac monitoring, CNS depression, watch for QTC prolongation on EKG, urinary retention, hypotension, and give benzo's for agitation. MD Ghosh notified.

## 2018-11-14 NOTE — SUBJECTIVE & OBJECTIVE
Interval History: Called to the ER with concerns for increasing agitation. Started a precedex gtt. The case was discussed with Dr. Le (Pulmonology). Precedex was stopped for concerns of QT prolongation given the patients overdose on Seroquel. Will increase Ativan and downgrade to telemetry. The patient will need a 1:1 sitter.       Review of Systems   Unable to perform ROS: Mental status change     Objective:     Vital Signs (Most Recent):  Temp: 98.6 °F (37 °C) (11/14/18 1339)  Pulse: 86 (11/14/18 1331)  Resp: 17 (11/14/18 1331)  BP: 101/60 (11/14/18 1321)  SpO2: 96 % (11/14/18 1331) Vital Signs (24h Range):  Temp:  [97.8 °F (36.6 °C)-98.9 °F (37.2 °C)] 98.6 °F (37 °C)  Pulse:  [] 86  Resp:  [17-35] 17  SpO2:  [94 %-98 %] 96 %  BP: (101-148)/(54-94) 101/60     Weight: 117.4 kg (258 lb 14.4 oz)  Body mass index is 34.16 kg/m².    Intake/Output Summary (Last 24 hours) at 11/14/2018 1421  Last data filed at 11/14/2018 1045  Gross per 24 hour   Intake 2681.25 ml   Output 2400 ml   Net 281.25 ml      Physical Exam   Constitutional: He is oriented to person, place, and time. He appears well-developed and well-nourished. No distress.   Disheveled, unkept     HENT:   Head: Normocephalic and atraumatic.   Nose: Nose normal.   Eyes: Conjunctivae and EOM are normal. Pupils are equal, round, and reactive to light. No scleral icterus.   Neck: Normal range of motion. Neck supple. No tracheal deviation present.   Cardiovascular: Normal rate, regular rhythm, normal heart sounds and intact distal pulses.   No murmur heard.  tachy   Pulmonary/Chest: Effort normal and breath sounds normal. No stridor. No respiratory distress. He has no wheezes. He has no rales.   Abdominal: Soft. Bowel sounds are normal. He exhibits no distension. There is no tenderness. There is no guarding.   Genitourinary:   Genitourinary Comments: ua neg   Musculoskeletal: Normal range of motion. He exhibits no edema, tenderness or deformity.    Neurological: He is alert and oriented to person, place, and time. No cranial nerve deficit.   Easy to awaken and appears hyperstimuliated/startled with continued stimuli to stay wake. incomprehensable speech and  quickly falls back to sleep without stimuli;lethargic   Skin: Skin is warm and dry. Capillary refill takes less than 2 seconds. No rash noted. He is not diaphoretic. No erythema.   Psychiatric: He has a normal mood and affect. His behavior is normal.   PEC'd  Unable to fully assess   Hyperstimulated/startled response with attempting to keep awake.    Nursing note and vitals reviewed.      Significant Labs: All pertinent labs within the past 24 hours have been reviewed.    Significant Imaging:   Imaging Results          X-Ray Chest 1 View (Final result)  Result time 11/13/18 23:39:57    Final result by Alvino Chapa MD (11/13/18 23:39:57)                 Impression:      No acute cardiopulmonary disease.      Electronically signed by: Alvino Chapa MD  Date:    11/13/2018  Time:    23:39             Narrative:    EXAMINATION:  XR CHEST 1 VIEW    CLINICAL HISTORY:  drug overdose;    COMPARISON:  None.    FINDINGS:  The lungs are clear. The cardiac silhouette is within normal limits. The bones are intact.

## 2018-11-14 NOTE — ED NOTES
"Pt mumbling, when asked to repeat what he said, pt stated, "who has some mojo and spice" then pt went back to sleep.  "

## 2018-11-14 NOTE — ED NOTES
"Pt reassessed by charge nurse Kevin and NP Mayra. Pt unable to tolerate CT due to agitation. Pt attempting to remove restraints, get out of bed, and remove medical equipment. Pt having auditory and visual hallucinations. Pt noted talking to his "pet" and "throwing stick." Pt has been noted turning his head towards the right having a conversation to a person that is not there.  "

## 2018-11-14 NOTE — PROGRESS NOTES
[facility] Hospital Medicine  Progress Note    Patient Name: Dimitrios Arzola  MRN: [id number]  Patient Class: IP- Inpatient   Admission Date: 11/13/2018  Length of Stay: 0 days  Attending Physician: Vinnie Newton MD  Primary Care Provider: Primary Doctor No        Subjective:     Principal Problem:Intentional overdose of drug in tablet form    HPI:  HPI limited due to patient condition, AMS and obtained from ER staff and records, no family present at this time. Patient has been PEC'd by ER provider. Dimitrios Arzola is a 33 y.o. male patient with PMHx of anxiety, bipolar affective, depression, psychosis, and overdose who presents for intentional drug overdose. Was reported that family found patient sleeping on the couch at 2115 and woke him up, was incoherent/confused. Reported bottle of 90 Seroquel filled on 11/8/18 and bottle is empty. Unsure how many pills were taken by patient. Patient was evalauted in the Er, given IV fluids and ativan .Poison control was called, states to do a UDS, tylenol, salicylate, ETOH, CPK, CMP levels, EKG, seizure precautions, continous cardiac monitoring, CNS depression, watch for QTC prolongation on EKG, urinary retention, hypotension, and give benzo's for agitation. HM consulted. EKG in er reviewed-->Tachycardia, QRS stable, patient remains tachycardic and is lethargic on exam. Patient maintaining airway at this time.  Patient admitted to ICU for close monitoring.       Hospital Course:  11/14/18 Called to the ER with concerns for increasing agitation. Started a precedex gtt. The case was discussed with Dr. Le (Pulmonology). Precedex was stopped for concerns of QT prolongation given the patients overdose on Seroquel. Will increase Ativan and downgrade to telemetry. The patient will need a 1:1 sitter.     Interval History: Called to the ER with concerns for increasing agitation. Started a precedex gtt. The case was discussed with Dr. Le (Pulmonology).  Precedex was stopped for concerns of QT prolongation given the patients overdose on Seroquel. Will increase Ativan and downgrade to telemetry. The patient will need a 1:1 sitter.       Review of Systems   Unable to perform ROS: Mental status change     Objective:     Vital Signs (Most Recent):  Temp: 98.6 °F (37 °C) (11/14/18 1339)  Pulse: 86 (11/14/18 1331)  Resp: 17 (11/14/18 1331)  BP: 101/60 (11/14/18 1321)  SpO2: 96 % (11/14/18 1331) Vital Signs (24h Range):  Temp:  [97.8 °F (36.6 °C)-98.9 °F (37.2 °C)] 98.6 °F (37 °C)  Pulse:  [] 86  Resp:  [17-35] 17  SpO2:  [94 %-98 %] 96 %  BP: (101-148)/(54-94) 101/60     Weight: 117.4 kg (258 lb 14.4 oz)  Body mass index is 34.16 kg/m².    Intake/Output Summary (Last 24 hours) at 11/14/2018 1421  Last data filed at 11/14/2018 1045  Gross per 24 hour   Intake 2681.25 ml   Output 2400 ml   Net 281.25 ml      Physical Exam   Constitutional: He is oriented to person, place, and time. He appears well-developed and well-nourished. No distress.   Disheveled, unkept     HENT:   Head: Normocephalic and atraumatic.   Nose: Nose normal.   Eyes: Conjunctivae and EOM are normal. Pupils are equal, round, and reactive to light. No scleral icterus.   Neck: Normal range of motion. Neck supple. No tracheal deviation present.   Cardiovascular: Normal rate, regular rhythm, normal heart sounds and intact distal pulses.   No murmur heard.  tachy   Pulmonary/Chest: Effort normal and breath sounds normal. No stridor. No respiratory distress. He has no wheezes. He has no rales.   Abdominal: Soft. Bowel sounds are normal. He exhibits no distension. There is no tenderness. There is no guarding.   Genitourinary:   Genitourinary Comments: ua neg   Musculoskeletal: Normal range of motion. He exhibits no edema, tenderness or deformity.   Neurological: He is alert and oriented to person, place, and time. No cranial nerve deficit.   Easy to awaken and appears hyperstimuliated/startled with  continued stimuli to stay wake. incomprehensable speech and  quickly falls back to sleep without stimuli;lethargic   Skin: Skin is warm and dry. Capillary refill takes less than 2 seconds. No rash noted. He is not diaphoretic. No erythema.   Psychiatric: He has a normal mood and affect. His behavior is normal.   PEC'd  Unable to fully assess   Hyperstimulated/startled response with attempting to keep awake.    Nursing note and vitals reviewed.      Significant Labs: All pertinent labs within the past 24 hours have been reviewed.    Significant Imaging:   Imaging Results          X-Ray Chest 1 View (Final result)  Result time 11/13/18 23:39:57    Final result by Alvino Chapa MD (11/13/18 23:39:57)                 Impression:      No acute cardiopulmonary disease.      Electronically signed by: Alvino Chapa MD  Date:    11/13/2018  Time:    23:39             Narrative:    EXAMINATION:  XR CHEST 1 VIEW    CLINICAL HISTORY:  drug overdose;    COMPARISON:  None.    FINDINGS:  The lungs are clear. The cardiac silhouette is within normal limits. The bones are intact.                                  Assessment/Plan:      * Intentional overdose of drug in tablet form    Serial EKG, BMP  Monitor RFP/LFTs  Hour neuro checks  Airway monitoring  Seizure precauations   Supportive care while clearance of medication. Recommendations by poison control noted.   Hold antipsychotics  Tele Psych consult  PEC'd   11/14/18 Called to the ER with concerns for increasing agitation. Started a precedex gtt. The case was discussed with Dr. Le (Pulmonology). Precedex was stopped for concerns of QT prolongation given the patients overdose on Seroquel. Will increase Ativan and downgrade to telemetry. The patient will need a 1:1 sitter.        Elevated BP without diagnosis of hypertension    Monitor trends  Continue daily therapy pending course       Schizoaffective disorder, bipolar type with psychotic features    Check CT head, ABG and TSH  given overdose to complete basic organic workup  Patient with prior hx of bipolar and SI attempt in past. Per nurse patient has been having delusions and hallucinations earlier in Er visit.    Psych consult   Hold antipsychotics at this time. Ativan as need  Will check depakote level continue pending course     Hyperlipidemia    Check lipids, a1c,   Consider daily therapy pending course          VTE Risk Mitigation (From admission, onward)        Ordered     enoxaparin injection 40 mg  Daily      11/14/18 0102     IP VTE HIGH RISK PATIENT  Once      11/14/18 0102     Place sequential compression device  Until discontinued      11/14/18 0102     Place TRELL hose  Until discontinued      11/14/18 0102              Yeison Shankar NP  Department of Hospital Medicine   [facility]

## 2018-11-14 NOTE — ED PROVIDER NOTES
"SCRIBE #1 NOTE: I, Lesa Ortiz, am scribing for, and in the presence of, Siddhartha Ghosh MD. I have scribed the entire note.         History     Chief Complaint   Patient presents with    Drug Overdose     Pt took an unknown amount of Seroquel 100 mg. Intentional OD. Bottled filled on 11/8/18 w/ quantity of 90. Bottle found empty. Found at 2115 tonight, AMS.       Review of patient's allergies indicates:   Allergen Reactions    Risperdal [risperidone] Other (See Comments)     gynecomastica         History of Present Illness   HPI    11/13/2018, 11:00 PM  History obtained from the [facility]   History limited secondary to patient's AMS      History of Present Illness: Dimitrios Arzola is a 33 y.o. male patient with PMHx of anxiety, bipolar affective, depression, psychosis, and overdose who presents to the Emergency Department for drug overdose. Per [facility], family found patient sleeping on the couch at 2115. When patient woke, family states patient was "talking nonsense" and confused. Patient had bottle of Seroquel with quantity of 90 filled on 11/8/18 and bottle is empty. Per [facility], family is unsure how many patient took. Per [facility], family reports it was intentional overdose. History and ROS limited secondary to patient's AMS.    Arrival mode:  [facility]    PCP: Primary Doctor No        Past Medical History:  Past Medical History:   Diagnosis Date    ADHD (attention deficit hyperactivity disorder)     Alcohol abuse     Anxiety     Bipolar affective     Depression     History of psychiatric hospitalization     Hx of psychiatric care     Overdose 10/2018    ambien and chloracedin D    Psychiatric problem     Psychosis 10/18/2017    Therapy        Past Surgical History:  Past Surgical History:   Procedure Laterality Date    ANKLE SURGERY      right         Family History:  Family History   Family history unknown: Yes       Social History:  Social History     Tobacco Use    Smoking status: Current Every Day " Smoker     Packs/day: 0.50     Types: Cigarettes    Smokeless tobacco: Never Used   Substance and Sexual Activity    Alcohol use: Yes     Comment: Vodka occasionally     Drug use: Yes     Types: Marijuana    Sexual activity: Yes     Partners: Female     Birth control/protection: None        Review of Systems   Review of Systems   Unable to perform ROS: Mental status change        Physical Exam     Initial Vitals [11/13/18 2258]   BP Pulse Resp Temp SpO2   117/81 (!) 136 (!) 22 97.8 °F (36.6 °C) 96 %      MAP       --          Physical Exam  Nursing Notes and Vital Signs Reviewed.  History limited secondary to patient being confused.  Constitutional: Patient is in no acute distress. Well-developed and well-nourished.  Head: Atraumatic. Normocephalic.  Eyes: PERRL. EOM intact. Conjunctivae are not pale. No scleral icterus.  ENT: Mucous membranes are moist. Oropharynx is clear and symmetric. Airway patent.  Neck: Supple. Full ROM.   Cardiovascular: Tachycardic. Regular rhythm. No murmurs, rubs, or gallops. Distal pulses are 2+ and symmetric.  Pulmonary/Chest: No respiratory distress. Clear to auscultation bilaterally. No wheezing or rales.  Abdominal: Soft and non-distended.  There is no tenderness.  No rebound, guarding, or rigidity.   Musculoskeletal: Moves all extremities. No obvious deformities.   Skin: Warm and dry.  Neurological:  Awake. Patient is confused.    Psychiatric:               Behavior: uncooperative              Mood and Affect: depressed, flat affect     ED Course   Critical Care  Date/Time: 11/14/2018 1:13 AM  Performed by: Siddhartha Ghosh MD  Authorized by: Siddhartha Ghosh MD   Direct patient critical care time: 9 minutes  Additional history critical care time: 9 minutes  Ordering / reviewing critical care time: 9 minutes  Documentation critical care time: 9 minutes  Consulting other physicians critical care time: 9 minutes  Total critical care time (exclusive of procedural  "time) : 45 minutes  Critical care time was exclusive of separately billable procedures and treating other patients and teaching time.  Critical care was necessary to treat or prevent imminent or life-threatening deterioration of the following conditions: drug overdose.  Critical care was time spent personally by me on the following activities: blood draw for specimens, development of treatment plan with patient or surrogate, discussions with consultants, interpretation of cardiac output measurements, evaluation of patient's response to treatment, examination of patient, obtaining history from patient or surrogate, ordering and performing treatments and interventions, ordering and review of laboratory studies, ordering and review of radiographic studies, re-evaluation of patient's condition, review of old charts and pulse oximetry.        ED Vital Signs:  Vitals:    11/13/18 2258 11/13/18 2303 11/14/18 0023   BP: 117/81  110/84   Pulse: (!) 136 (!) 132 (!) 125   Resp: (!) 22  (!) 22   Temp: 97.8 °F (36.6 °C)     TempSrc: Oral     SpO2: 96%  97%   Weight: 117.4 kg (258 lb 14.4 oz)     Height: 6' 1" (1.854 m)         Abnormal Lab Results:  Labs Reviewed   CBC W/ AUTO DIFFERENTIAL - Abnormal; Notable for the following components:       Result Value    RBC 4.51 (*)     MCH 31.9 (*)     MPV 8.7 (*)     All other components within normal limits   COMPREHENSIVE METABOLIC PANEL - Abnormal; Notable for the following components:    Alkaline Phosphatase 50 (*)     All other components within normal limits   URINALYSIS - Abnormal; Notable for the following components:    Ketones, UA Trace (*)     All other components within normal limits   SALICYLATE LEVEL - Abnormal; Notable for the following components:    Salicylate Lvl <5.0 (*)     All other components within normal limits   ACETAMINOPHEN LEVEL - Abnormal; Notable for the following components:    Acetaminophen (Tylenol), Serum <3.0 (*)     All other components within normal " limits   ALCOHOL,MEDICAL (ETHANOL)   DRUG SCREEN PANEL, URINE EMERGENCY   TROPONIN I   PROTIME-INR   APTT   SALICYLATE LEVEL   ACETAMINOPHEN LEVEL   MAGNESIUM   PHOSPHORUS   TSH   BASIC METABOLIC PANEL   LIPID PANEL   HEMOGLOBIN A1C   CBC W/ AUTO DIFFERENTIAL   PROTIME-INR   APTT   HEPATIC FUNCTION PANEL   RENAL FUNCTION PANEL        All Lab Results:  Results for orders placed or performed during the hospital encounter of 11/13/18   CBC auto differential   Result Value Ref Range    WBC 7.34 3.90 - 12.70 K/uL    RBC 4.51 (L) 4.60 - 6.20 M/uL    Hemoglobin 14.4 14.0 - 18.0 g/dL    Hematocrit 41.9 40.0 - 54.0 %    MCV 93 82 - 98 fL    MCH 31.9 (H) 27.0 - 31.0 pg    MCHC 34.4 32.0 - 36.0 g/dL    RDW 12.8 11.5 - 14.5 %    Platelets 240 150 - 350 K/uL    MPV 8.7 (L) 9.2 - 12.9 fL    Gran # (ANC) 3.6 1.8 - 7.7 K/uL    Lymph # 2.8 1.0 - 4.8 K/uL    Mono # 0.7 0.3 - 1.0 K/uL    Eos # 0.2 0.0 - 0.5 K/uL    Baso # 0.02 0.00 - 0.20 K/uL    Gran% 49.9 38.0 - 73.0 %    Lymph% 38.4 18.0 - 48.0 %    Mono% 9.9 4.0 - 15.0 %    Eosinophil% 2.0 0.0 - 8.0 %    Basophil% 0.3 0.0 - 1.9 %    Differential Method Automated    Comprehensive metabolic panel   Result Value Ref Range    Sodium 140 136 - 145 mmol/L    Potassium 4.3 3.5 - 5.1 mmol/L    Chloride 103 95 - 110 mmol/L    CO2 24 23 - 29 mmol/L    Glucose 96 70 - 110 mg/dL    BUN, Bld 11 6 - 20 mg/dL    Creatinine 1.1 0.5 - 1.4 mg/dL    Calcium 9.8 8.7 - 10.5 mg/dL    Total Protein 7.2 6.0 - 8.4 g/dL    Albumin 3.8 3.5 - 5.2 g/dL    Total Bilirubin 0.3 0.1 - 1.0 mg/dL    Alkaline Phosphatase 50 (L) 55 - 135 U/L    AST 26 10 - 40 U/L    ALT 30 10 - 44 U/L    Anion Gap 13 8 - 16 mmol/L    eGFR if African American >60 >60 mL/min/1.73 m^2    eGFR if non African American >60 >60 mL/min/1.73 m^2   Ethanol   Result Value Ref Range    Alcohol, Medical, Serum <10 <10 mg/dL   Drug screen panel, emergency   Result Value Ref Range    Benzodiazepines Negative     Methadone metabolites Negative      Cocaine (Metab.) Negative     Opiate Scrn, Ur Negative     Barbiturate Screen, Ur Negative     Amphetamine Screen, Ur Negative     THC Negative     Phencyclidine Negative     Creatinine, Random Ur 128.3 23.0 - 375.0 mg/dL    Toxicology Information SEE COMMENT    Urinalysis   Result Value Ref Range    Specimen UA Urine, Catheterized     Color, UA Yellow Yellow, Straw, Geovanna    Appearance, UA Clear Clear    pH, UA 7.0 5.0 - 8.0    Specific Gravity, UA 1.025 1.005 - 1.030    Protein, UA Negative Negative    Glucose, UA Negative Negative    Ketones, UA Trace (A) Negative    Bilirubin (UA) Negative Negative    Occult Blood UA Negative Negative    Nitrite, UA Negative Negative    Urobilinogen, UA Negative <2.0 EU/dL    Leukocytes, UA Negative Negative   Troponin I   Result Value Ref Range    Troponin I <0.006 0.000 - 0.026 ng/mL   Protime-INR   Result Value Ref Range    Prothrombin Time 10.2 9.0 - 12.5 sec    INR 0.9 0.8 - 1.2   APTT   Result Value Ref Range    aPTT 28.3 21.0 - 32.0 sec   Salicylate level   Result Value Ref Range    Salicylate Lvl <5.0 (L) 15.0 - 30.0 mg/dL   Acetaminophen level   Result Value Ref Range    Acetaminophen (Tylenol), Serum <3.0 (L) 10.0 - 20.0 ug/mL       Imaging Results:  Imaging Results          X-Ray Chest 1 View (Final result)  Result time 11/13/18 23:39:57    Final result by Alvino Chapa MD (11/13/18 23:39:57)                 Impression:      No acute cardiopulmonary disease.      Electronically signed by: Alvino Chapa MD  Date:    11/13/2018  Time:    23:39             Narrative:    EXAMINATION:  XR CHEST 1 VIEW    CLINICAL HISTORY:  drug overdose;    COMPARISON:  None.    FINDINGS:  The lungs are clear. The cardiac silhouette is within normal limits. The bones are intact.                               The EKG was ordered, reviewed, and independently interpreted by the ED provider.  Interpretation time: 2303  Rate: 132 BPM  Rhythm: sinus tachycardia  Interpretation: Otherwise normal  ECG. No acute ST abnormality. No STEMI.          The Emergency Provider reviewed the vital signs and test results, which are outlined above.     ED Discussion     11:30 PM: The PEC hold has been issued by Dr. Ghosh at this time for patient being a danger to himself.    11:33 PM: Nurse spoke to poison control. Poison control recommended UDS, tylenol, salicylate, ETOH, CPK, CMP levels, EKG, seizure precautions, continous cardiac monitoring, CNS depression, watch for QTC prolongation on EKG, urinary retention, hypotension, and give benzo's for agitation.     12:49 AM: Re-evaluated pt. Pt is resting comfortably and is in no acute distress. Patient remains confused.    12:58 AM: Discussed case with Yamil Nunez NP ([facility]). Dr. Pryor agrees with current care and management of pt and accepts admission.   Admitting Service: Hospital medicine   Admitting Physician: Dr. Pryor  Admit to: ICU        ED Medication(s):  Medications   sodium chloride 0.9% flush 5 mL (not administered)   acetaminophen tablet 650 mg (not administered)   ondansetron injection 4 mg (not administered)   glucose chewable tablet 16 g (not administered)   glucose chewable tablet 24 g (not administered)   dextrose 50% injection 12.5 g (not administered)   dextrose 50% injection 25 g (not administered)   glucagon (human recombinant) injection 1 mg (not administered)   enoxaparin injection 40 mg (not administered)   0.9%  NaCl infusion (not administered)   lorazepam (ATIVAN) injection 2 mg (not administered)   sodium chloride 0.9% bolus 1,000 mL (0 mLs Intravenous Stopped 11/14/18 0102)   lorazepam (ATIVAN) injection 2 mg (2 mg Intravenous Given 11/13/18 2340)   sodium chloride 0.9% bolus 1,000 mL (1,000 mLs Intravenous New Bag 11/14/18 0112)                Medical Decision Making     Medical Decision Making:   Clinical Tests:   Lab Tests: Ordered and Reviewed  Radiological Study: Ordered and Reviewed  Medical Tests: Ordered and Reviewed              Scribe Attestation:   Scribe #1: I performed the above scribed service and the documentation accurately describes the services I performed. I attest to the accuracy of the note.     Attending:   Physician Attestation Statement for Scribe #1: I, Siddhartha Ghosh MD, personally performed the services described in this documentation, as scribed by Lesa Ortiz, in my presence, and it is both accurate and complete.           Clinical Impression       ICD-10-CM ICD-9-CM   1. Drug overdose T50.901A 977.9     E980.5   2. Overdose T50.901A 977.9     E980.5   3. Suicide attempt T14.91XA E958.9   4. Intentional overdose of drug in tablet form T50.902A 977.9       Disposition:   Disposition: Admitted (ICU)  Condition: Serious         Siddhartha Ghosh MD  11/16/18 4474

## 2018-11-15 VITALS
BODY MASS INDEX: 34.31 KG/M2 | DIASTOLIC BLOOD PRESSURE: 77 MMHG | WEIGHT: 258.88 LBS | HEART RATE: 97 BPM | RESPIRATION RATE: 20 BRPM | TEMPERATURE: 98 F | OXYGEN SATURATION: 95 % | HEIGHT: 73 IN | SYSTOLIC BLOOD PRESSURE: 139 MMHG

## 2018-11-15 PROBLEM — F32.A DEPRESSION: Status: ACTIVE | Noted: 2018-11-15

## 2018-11-15 LAB
ALBUMIN SERPL BCP-MCNC: 3.5 G/DL
ALBUMIN SERPL BCP-MCNC: 3.5 G/DL
ALP SERPL-CCNC: 45 U/L
ALT SERPL W/O P-5'-P-CCNC: 27 U/L
ANION GAP SERPL CALC-SCNC: 8 MMOL/L
ANION GAP SERPL CALC-SCNC: 9 MMOL/L
AST SERPL-CCNC: 28 U/L
BASOPHILS # BLD AUTO: 0 K/UL
BASOPHILS NFR BLD: 0 %
BILIRUB DIRECT SERPL-MCNC: 0.1 MG/DL
BILIRUB SERPL-MCNC: 0.4 MG/DL
BUN SERPL-MCNC: 7 MG/DL
BUN SERPL-MCNC: 8 MG/DL
CALCIUM SERPL-MCNC: 8.8 MG/DL
CALCIUM SERPL-MCNC: 8.8 MG/DL
CALCIUM SERPL-MCNC: 8.9 MG/DL
CALCIUM SERPL-MCNC: 9 MG/DL
CHLORIDE SERPL-SCNC: 103 MMOL/L
CHLORIDE SERPL-SCNC: 104 MMOL/L
CK SERPL-CCNC: 554 U/L
CO2 SERPL-SCNC: 24 MMOL/L
CO2 SERPL-SCNC: 25 MMOL/L
CREAT SERPL-MCNC: 0.9 MG/DL
DIFFERENTIAL METHOD: ABNORMAL
EOSINOPHIL # BLD AUTO: 0.1 K/UL
EOSINOPHIL NFR BLD: 0.9 %
ERYTHROCYTE [DISTWIDTH] IN BLOOD BY AUTOMATED COUNT: 12.8 %
EST. GFR  (AFRICAN AMERICAN): >60 ML/MIN/1.73 M^2
EST. GFR  (NON AFRICAN AMERICAN): >60 ML/MIN/1.73 M^2
GLUCOSE SERPL-MCNC: 105 MG/DL
GLUCOSE SERPL-MCNC: 105 MG/DL
GLUCOSE SERPL-MCNC: 90 MG/DL
GLUCOSE SERPL-MCNC: 98 MG/DL
HCT VFR BLD AUTO: 38.5 %
HGB BLD-MCNC: 12.9 G/DL
LYMPHOCYTES # BLD AUTO: 1.2 K/UL
LYMPHOCYTES NFR BLD: 12.4 %
MCH RBC QN AUTO: 31.1 PG
MCHC RBC AUTO-ENTMCNC: 33.5 G/DL
MCV RBC AUTO: 93 FL
MONOCYTES # BLD AUTO: 0.9 K/UL
MONOCYTES NFR BLD: 9.9 %
NEUTROPHILS # BLD AUTO: 7.2 K/UL
NEUTROPHILS NFR BLD: 76.8 %
PHOSPHATE SERPL-MCNC: 3.2 MG/DL
PLATELET # BLD AUTO: 211 K/UL
PMV BLD AUTO: 9 FL
POCT GLUCOSE: 108 MG/DL (ref 70–110)
POCT GLUCOSE: 83 MG/DL (ref 70–110)
POTASSIUM SERPL-SCNC: 4 MMOL/L
POTASSIUM SERPL-SCNC: 4 MMOL/L
POTASSIUM SERPL-SCNC: 4.2 MMOL/L
POTASSIUM SERPL-SCNC: 4.4 MMOL/L
PROT SERPL-MCNC: 6.5 G/DL
RBC # BLD AUTO: 4.15 M/UL
SODIUM SERPL-SCNC: 136 MMOL/L
SODIUM SERPL-SCNC: 137 MMOL/L
WBC # BLD AUTO: 9.34 K/UL

## 2018-11-15 PROCEDURE — 80048 BASIC METABOLIC PNL TOTAL CA: CPT | Mod: 91

## 2018-11-15 PROCEDURE — 93010 ELECTROCARDIOGRAM REPORT: CPT | Mod: 76,,, | Performed by: INTERNAL MEDICINE

## 2018-11-15 PROCEDURE — 93010 ELECTROCARDIOGRAM REPORT: CPT | Mod: ,,, | Performed by: INTERNAL MEDICINE

## 2018-11-15 PROCEDURE — 63600175 PHARM REV CODE 636 W HCPCS: Performed by: NURSE PRACTITIONER

## 2018-11-15 PROCEDURE — 85025 COMPLETE CBC W/AUTO DIFF WBC: CPT

## 2018-11-15 PROCEDURE — 82247 BILIRUBIN TOTAL: CPT

## 2018-11-15 PROCEDURE — 80069 RENAL FUNCTION PANEL: CPT

## 2018-11-15 PROCEDURE — 82550 ASSAY OF CK (CPK): CPT

## 2018-11-15 PROCEDURE — C9113 INJ PANTOPRAZOLE SODIUM, VIA: HCPCS | Performed by: NURSE PRACTITIONER

## 2018-11-15 PROCEDURE — 80048 BASIC METABOLIC PNL TOTAL CA: CPT

## 2018-11-15 PROCEDURE — 84075 ASSAY ALKALINE PHOSPHATASE: CPT

## 2018-11-15 PROCEDURE — 25000003 PHARM REV CODE 250: Performed by: NURSE PRACTITIONER

## 2018-11-15 PROCEDURE — 84155 ASSAY OF PROTEIN SERUM: CPT

## 2018-11-15 RX ORDER — SODIUM CHLORIDE 9 MG/ML
INJECTION, SOLUTION INTRAVENOUS CONTINUOUS
Status: DISCONTINUED | OUTPATIENT
Start: 2018-11-15 | End: 2018-11-15 | Stop reason: HOSPADM

## 2018-11-15 RX ADMIN — FENTANYL CITRATE 50 MCG: 50 INJECTION INTRAMUSCULAR; INTRAVENOUS at 09:11

## 2018-11-15 RX ADMIN — SODIUM CHLORIDE: 0.9 INJECTION, SOLUTION INTRAVENOUS at 04:11

## 2018-11-15 RX ADMIN — PANTOPRAZOLE SODIUM 40 MG: 40 INJECTION, POWDER, FOR SOLUTION INTRAVENOUS at 09:11

## 2018-11-15 RX ADMIN — LORAZEPAM 2 MG: 2 INJECTION INTRAMUSCULAR; INTRAVENOUS at 01:11

## 2018-11-15 RX ADMIN — FENTANYL CITRATE 50 MCG: 50 INJECTION INTRAMUSCULAR; INTRAVENOUS at 03:11

## 2018-11-15 NOTE — ED NOTES
Pt resting in bed with eyes closed. Pt remains in 2 point soft wrist restraints. Jacobo Beltran at bedside completing q15 min checks. NAD noted. Will continue to monitor.

## 2018-11-15 NOTE — ED NOTES
"Pt hallucinating and stated, "Somebody just shoot me in the head and end my life." He followed this statement up with asking for a car ride so he could "catch the game."  "

## 2018-11-15 NOTE — DISCHARGE SUMMARY
[facility] Hospital Medicine  Discharge Summary      Patient Name: Dimitrios Arzola  MRN: [id number]  Admission Date: 11/13/2018  Hospital Length of Stay: 1 days  Discharge Date and Time:  11/15/2018 1:39 PM  Attending Physician: Katie att. providers found   Discharging Provider: Yeison Shankar NP  Primary Care Provider: Primary Doctor Katie      HPI:   HPI limited due to patient condition, AMS and obtained from ER staff and records, no family present at this time. Patient has been PEC'd by ER provider. Dimitrios Arzola is a 33 y.o. male patient with PMHx of anxiety, bipolar affective, depression, psychosis, and overdose who presents for intentional drug overdose. Was reported that family found patient sleeping on the couch at 2115 and woke him up, was incoherent/confused. Reported bottle of 90 Seroquel filled on 11/8/18 and bottle is empty. Unsure how many pills were taken by patient. Patient was evalauted in the Er, given IV fluids and ativan .Poison control was called, states to do a UDS, tylenol, salicylate, ETOH, CPK, CMP levels, EKG, seizure precautions, continous cardiac monitoring, CNS depression, watch for QTC prolongation on EKG, urinary retention, hypotension, and give benzo's for agitation. HM consulted. EKG in er reviewed-->Tachycardia, QRS stable, patient remains tachycardic and is lethargic on exam. Patient maintaining airway at this time.  Patient admitted to ICU for close monitoring.       * No surgery found *      Hospital Course:   11/14/18 Called to the ER with concerns for increasing agitation. Started a precedex gtt. The case was discussed with Dr. Le (Pulmonology). Precedex was stopped for concerns of QT prolongation given the patients overdose on Seroquel. Will increase Ativan and downgrade to telemetry. The patient will need a 1:1 sitter. 11/15/18 The patient is hemodynamically stable today. He is currently oriented x 3 and denies any current suicidal or homicidal  ideations. The patient is medically cleared for psych placement. The patient was seen and examined today and deemed stable for discharge. The patient was accepted to [facility] psych facility and will be transferred there once transportation is arranged.      Consults:   Consults (From admission, onward)        Status Ordering Provider     Inpatient consult to Pulmonology  Once     Provider:  Evelio Le MD    Acknowledged SANJUANA CHIANG     Inpatient consult to Telemedicine - Psyc  Once     Provider:  (Not yet assigned)    Acknowledged SANJUANA CHIANG          No new Assessment & Plan notes have been filed under this hospital service since the last note was generated.  Service: Hospital Medicine    Final Active Diagnoses:    Diagnosis Date Noted POA    PRINCIPAL PROBLEM:  Intentional overdose of drug in tablet form [T50.902A] 11/14/2018 Yes    Schizoaffective disorder, bipolar type with psychotic features [F25.0] 11/14/2018 Yes    Elevated BP without diagnosis of hypertension [R03.0] 11/14/2018 Yes    Drug overdose [T50.901A] 11/14/2018 Yes    Hyperlipidemia [E78.5] 10/22/2017 Yes      Problems Resolved During this Admission:       Discharged Condition: stable    Disposition: Psychiatric Hospital    Follow Up:    Patient Instructions:   No discharge procedures on file.    Significant Diagnostic Studies:   Imaging Results          CT Head Without Contrast (Final result)  Result time 11/14/18 15:03:07    Final result by Sushant Rojas Jr., MD (11/14/18 15:03:07)                 Impression:      No acute intracranial findings evident.    All CT scans at this facility are performed  using dose modulation techniques as appropriate to performed exam including the following:  automated exposure control; adjustment of mA and/or kV according to the patients size (this includes techniques or standardized protocols for targeted exams where dose is matched to indication/reason for exam: i.e. extremities or head);   iterative reconstruction technique.      Electronically signed by: Ivy Sorensen MD  Date:    11/14/2018  Time:    15:03             Narrative:    EXAMINATION:  CT HEAD WITHOUT CONTRAST    CLINICAL HISTORY:  Confusion/delirium, altered LOC, unexplained;overdose;    TECHNIQUE:  Noncontrast axial images of the head were obtained.    COMPARISON:  08/28/2013    FINDINGS:  Ventricular system is normal.  No hydrocephalus.  No midline shift.  No abnormal density to indicate acute major vascular distribution ischemic infarction or hemorrhage.  No mass effect.  No extra-axial fluid collections.    Visualized paranasal sinuses and mastoid air cells appear clear.    No calvarial fracture.    No significant change.                               X-Ray Chest 1 View (Final result)  Result time 11/13/18 23:39:57    Final result by Alvino Chapa MD (11/13/18 23:39:57)                 Impression:      No acute cardiopulmonary disease.      Electronically signed by: Alvino Chapa MD  Date:    11/13/2018  Time:    23:39             Narrative:    EXAMINATION:  XR CHEST 1 VIEW    CLINICAL HISTORY:  drug overdose;    COMPARISON:  None.    FINDINGS:  The lungs are clear. The cardiac silhouette is within normal limits. The bones are intact.                                   Pending Diagnostic Studies:     Procedure Component Value Units Date/Time    EKG 12-LEAD [[id number]]     Order Status:  Sent Lab Status:  No result     EKG 12-LEAD [[id number]]     Order Status:  Sent Lab Status:  No result     EKG 12-LEAD [[id number]]     Order Status:  Sent Lab Status:  No result     EKG 12-LEAD [[id number]]     Order Status:  Sent Lab Status:  No result          Medications:  Reconciled Home Medications:      Medication List      CONTINUE taking these medications    CORICIDIN HBP COLD AND FLU ORAL  Take by mouth.     divalproex  MG Tb24  Commonly known as:  DEPAKOTE  Take 500 mg by mouth once daily.     FLUoxetine 40 MG capsule  Commonly known  as:  PROZAC  Take 40 mg by mouth once daily.     gabapentin 100 MG capsule  Commonly known as:  NEURONTIN  Take 100 mg by mouth 3 (three) times daily.     SEROQUEL ORAL  Take by mouth.     VISTARIL ORAL  Take by mouth.            Indwelling Lines/Drains at time of discharge:   Lines/Drains/Airways     Drain                 Urethral Catheter 11/14/18 0015 Non-latex 16 Fr. 1 day                Time spent on the discharge of patient: > 30 minutes  Patient was seen and examined on the date of discharge and determined to be suitable for discharge.         Yeison Shankar NP  Department of Hospital Medicine  [facility]

## 2018-11-15 NOTE — ED NOTES
"Patient continues to be very restless, agitated;having hallucinations stating " let me out, let me out of this cage".  "

## 2018-11-15 NOTE — ED NOTES
"Pt stated, "I just gotta get back to my unit, my [employer]." and seconds later pt stated, "can I get out of this chair, I don't have time to get this hair cut." Reminded pt that he is in the hospital. Primary nurse notified, will continue to monitor.   "

## 2018-11-15 NOTE — ED NOTES
CPT placement accepted by [facility] at [facility] located at [address] for the service of Kerline Bai NP. Please call report in 20 minutes to [phone].

## 2018-11-15 NOTE — ED NOTES
Pt sitting up in bed yelling and using curse words.  Minimally redirected.  Pulling against restraints.  Will give PRN Ativan.

## 2018-11-15 NOTE — PROGRESS NOTES
Patient examined. Patient broke out of soft restraints. Requring hard restraints. Will adjust medications and monitor response.

## 2018-11-15 NOTE — PROGRESS NOTES
[facility] Hospital Medicine  Progress Note    Patient Name: Dimitrios Arzola  MRN: [id number]  Patient Class: IP- Inpatient   Admission Date: 11/13/2018  Length of Stay: 1 days  Attending Physician: Vinnie Newton MD  Primary Care Provider: Primary Doctor No        Subjective:     Principal Problem:Intentional overdose of drug in tablet form    HPI:  HPI limited due to patient condition, AMS and obtained from ER staff and records, no family present at this time. Patient has been PEC'd by ER provider. Dimitrios Arzola is a 33 y.o. male patient with PMHx of anxiety, bipolar affective, depression, psychosis, and overdose who presents for intentional drug overdose. Was reported that family found patient sleeping on the couch at 2115 and woke him up, was incoherent/confused. Reported bottle of 90 Seroquel filled on 11/8/18 and bottle is empty. Unsure how many pills were taken by patient. Patient was evalauted in the Er, given IV fluids and ativan .Poison control was called, states to do a UDS, tylenol, salicylate, ETOH, CPK, CMP levels, EKG, seizure precautions, continous cardiac monitoring, CNS depression, watch for QTC prolongation on EKG, urinary retention, hypotension, and give benzo's for agitation. HM consulted. EKG in er reviewed-->Tachycardia, QRS stable, patient remains tachycardic and is lethargic on exam. Patient maintaining airway at this time.  Patient admitted to ICU for close monitoring.       Hospital Course:  11/14/18 Called to the ER with concerns for increasing agitation. Started a precedex gtt. The case was discussed with Dr. Le (Pulmonology). Precedex was stopped for concerns of QT prolongation given the patients overdose on Seroquel. Will increase Ativan and downgrade to telemetry. The patient will need a 1:1 sitter. 11/15/18 The patient is hemodynamically stable today. He is currently oriented x 3 and denies any current suicidal or homicidal ideations. The patient is  medically cleared for psych placement.     Interval History: The patient is hemodynamically stable today. He is currently oriented x 3 and denies any current suicidal or homicidal ideations. The patient is medically cleared for psych placement.     Review of Systems   Unable to perform ROS: Mental status change     Objective:     Vital Signs (Most Recent):  Temp: 98.6 °F (37 °C) (11/14/18 1339)  Pulse: 100 (11/15/18 0700)  Resp: 20 (11/15/18 0700)  BP: 135/65 (11/15/18 0700)  SpO2: 97 % (11/15/18 0700) Vital Signs (24h Range):  Temp:  [98.6 °F (37 °C)-98.9 °F (37.2 °C)] 98.6 °F (37 °C)  Pulse:  [] 100  Resp:  [14-43] 20  SpO2:  [79 %-98 %] 97 %  BP: (101-197)/() 135/65     Weight: 117.4 kg (258 lb 14.4 oz)  Body mass index is 34.16 kg/m².    Intake/Output Summary (Last 24 hours) at 11/15/2018 0843  Last data filed at 11/14/2018 1914  Gross per 24 hour   Intake --   Output 2450 ml   Net -2450 ml      Physical Exam   Constitutional: He is oriented to person, place, and time. He appears well-developed and well-nourished. No distress.   Disheveled, unkept     HENT:   Head: Normocephalic and atraumatic.   Nose: Nose normal.   Eyes: Conjunctivae and EOM are normal. Pupils are equal, round, and reactive to light. No scleral icterus.   Neck: Normal range of motion. Neck supple. No tracheal deviation present.   Cardiovascular: Normal rate, regular rhythm, normal heart sounds and intact distal pulses.   No murmur heard.  tachy   Pulmonary/Chest: Effort normal and breath sounds normal. No stridor. No respiratory distress. He has no wheezes. He has no rales.   Abdominal: Soft. Bowel sounds are normal. He exhibits no distension. There is no tenderness. There is no guarding.   Genitourinary:   Genitourinary Comments: ua neg   Musculoskeletal: Normal range of motion. He exhibits no edema, tenderness or deformity.   Neurological: He is alert and oriented to person, place, and time. No cranial nerve deficit.   Skin: Skin  is warm and dry. Capillary refill takes less than 2 seconds. No rash noted. He is not diaphoretic. No erythema.   Psychiatric: He has a normal mood and affect. His behavior is normal.   PEC'd  Unable to fully assess   Hyperstimulated/startled response with attempting to keep awake.    Nursing note and vitals reviewed.      Significant Labs: All pertinent labs within the past 24 hours have been reviewed.    Significant Imaging:   Imaging Results          CT Head Without Contrast (Final result)  Result time 11/14/18 15:03:07    Final result by Sushant Sorensen Jr., MD (11/14/18 15:03:07)                 Impression:      No acute intracranial findings evident.    All CT scans at this facility are performed  using dose modulation techniques as appropriate to performed exam including the following:  automated exposure control; adjustment of mA and/or kV according to the patients size (this includes techniques or standardized protocols for targeted exams where dose is matched to indication/reason for exam: i.e. extremities or head);  iterative reconstruction technique.      Electronically signed by: Sushant Sorensen MD  Date:    11/14/2018  Time:    15:03             Narrative:    EXAMINATION:  CT HEAD WITHOUT CONTRAST    CLINICAL HISTORY:  Confusion/delirium, altered LOC, unexplained;overdose;    TECHNIQUE:  Noncontrast axial images of the head were obtained.    COMPARISON:  08/28/2013    FINDINGS:  Ventricular system is normal.  No hydrocephalus.  No midline shift.  No abnormal density to indicate acute major vascular distribution ischemic infarction or hemorrhage.  No mass effect.  No extra-axial fluid collections.    Visualized paranasal sinuses and mastoid air cells appear clear.    No calvarial fracture.    No significant change.                               X-Ray Chest 1 View (Final result)  Result time 11/13/18 23:39:57    Final result by Alvino Chapa MD (11/13/18 23:39:57)                 Impression:      No  acute cardiopulmonary disease.      Electronically signed by: Alvino Chapa MD  Date:    11/13/2018  Time:    23:39             Narrative:    EXAMINATION:  XR CHEST 1 VIEW    CLINICAL HISTORY:  drug overdose;    COMPARISON:  None.    FINDINGS:  The lungs are clear. The cardiac silhouette is within normal limits. The bones are intact.                                   Assessment/Plan:      * Intentional overdose of drug in tablet form    Serial EKG, BMP  Monitor RFP/LFTs  Hour neuro checks  Airway monitoring  Seizure precauations   Supportive care while clearance of medication. Recommendations by poison control noted.   Hold antipsychotics  Tele Psych consult  PEC'd   11/14/18 Called to the ER with concerns for increasing agitation. Started a precedex gtt. The case was discussed with Dr. Le (Pulmonology). Precedex was stopped for concerns of QT prolongation given the patients overdose on Seroquel. Will increase Ativan and downgrade to telemetry. The patient will need a 1:1 sitter.   11/15/18 The patient is hemodynamically stable today. He is currently oriented x 3 and denies any current suicidal or homicidal ideations. The patient is medically cleared for psych placement. [facility] does not psychiatric services available therefore the patient will need transfer to a facility that offers these services.         Elevated BP without diagnosis of hypertension    Monitor trends  Continue daily therapy pending course       Schizoaffective disorder, bipolar type with psychotic features    Check CT head, ABG and TSH given overdose to complete basic organic workup  Patient with prior hx of bipolar and SI attempt in past. Per nurse patient has been having delusions and hallucinations earlier in Er visit.    Psych consult   Hold antipsychotics at this time. Ativan as need  Will check depakote level continue pending course     Hyperlipidemia    Check lipids, a1c,   Consider daily therapy pending course          VTE Risk Mitigation  (From admission, onward)        Ordered     enoxaparin injection 40 mg  Daily      11/14/18 0102     IP VTE HIGH RISK PATIENT  Once      11/14/18 0102     Place sequential compression device  Until discontinued      11/14/18 0102     Place TRELL hose  Until discontinued      11/14/18 0102              Yeison Shankar NP  Department of Hospital Medicine   [facility]

## 2018-11-15 NOTE — ED NOTES
Patient restraints removed at this time. Pt stated that he would be compliant with staff. Charge nurse and provider informed.  Restraints removed. Sitter at bedside. Will continue to monitor.

## 2018-11-15 NOTE — ED NOTES
Pt resting in bed, side rails up x 2, sitter at bedside with direct visualization of pt. Sitter filling out 15 minute flow sheet. NAD at this time. Will continue to monitor.

## 2018-11-15 NOTE — ED NOTES
Pt resting in bed with eyes closed. Sitter at bedside. Pt refusing to wear supplemental O2. Pt explained risk. Pt continuing to refuse supplemental O2 use. NAD noted. Will continue to monitor.

## 2018-11-15 NOTE — PROVIDER PROGRESS NOTES - EMERGENCY DEPT.
SCRIBE #2 NOTE: I, Christina Adams, am scribing for, and in the presence of, Chuck Valencia MD. Other sections scribed: Accepting Facility/Physician.       12:07 PM: Patient has been accepted by Kerline Bai NP at [facility]. Patient is stable and in NAD. Patient is ready for transfer.    Scribe Attestation:   Scribe #1: I performed the above scribed service and the documentation accurately describes the services I performed. I attest to the accuracy of the note.     Attending:   Physician Attestation Statement for Scribe #1: I, Chuck Valencia MD, personally performed the services described in this documentation, as scribed by Christina Adams, in my presence, and it is both accurate and complete.

## 2018-11-15 NOTE — ASSESSMENT & PLAN NOTE
Serial EKG, BMP  Monitor RFP/LFTs  Hour neuro checks  Airway monitoring  Seizure precauations   Supportive care while clearance of medication. Recommendations by poison control noted.   Hold antipsychotics  Tele Psych consult  PEC'd   11/14/18 Called to the ER with concerns for increasing agitation. Started a precedex gtt. The case was discussed with Dr. Le (Pulmonology). Precedex was stopped for concerns of QT prolongation given the patients overdose on Seroquel. Will increase Ativan and downgrade to telemetry. The patient will need a 1:1 sitter.   11/15/18 The patient is hemodynamically stable today. He is currently oriented x 3 and denies any current suicidal or homicidal ideations. The patient is medically cleared for psych placement.

## 2018-11-15 NOTE — ED NOTES
Pt lying in bed sleeping. RR are equal and unlabored. Sitter at bedside, will continue to monitor.

## 2018-11-15 NOTE — ED NOTES
Faxed PEC packet to [facility], [facility], [facility], and [facility].

## 2019-11-21 NOTE — SUBJECTIVE & OBJECTIVE
Interval History: The patient is hemodynamically stable today. He is currently oriented x 3 and denies any current suicidal or homicidal ideations. The patient is medically cleared for psych placement.     Review of Systems   Unable to perform ROS: Mental status change     Objective:     Vital Signs (Most Recent):  Temp: 98.6 °F (37 °C) (11/14/18 1339)  Pulse: 100 (11/15/18 0700)  Resp: 20 (11/15/18 0700)  BP: 135/65 (11/15/18 0700)  SpO2: 97 % (11/15/18 0700) Vital Signs (24h Range):  Temp:  [98.6 °F (37 °C)-98.9 °F (37.2 °C)] 98.6 °F (37 °C)  Pulse:  [] 100  Resp:  [14-43] 20  SpO2:  [79 %-98 %] 97 %  BP: (101-197)/() 135/65     Weight: 117.4 kg (258 lb 14.4 oz)  Body mass index is 34.16 kg/m².    Intake/Output Summary (Last 24 hours) at 11/15/2018 0843  Last data filed at 11/14/2018 1914  Gross per 24 hour   Intake --   Output 2450 ml   Net -2450 ml      Physical Exam   Constitutional: He is oriented to person, place, and time. He appears well-developed and well-nourished. No distress.   Disheveled, unkept     HENT:   Head: Normocephalic and atraumatic.   Nose: Nose normal.   Eyes: Conjunctivae and EOM are normal. Pupils are equal, round, and reactive to light. No scleral icterus.   Neck: Normal range of motion. Neck supple. No tracheal deviation present.   Cardiovascular: Normal rate, regular rhythm, normal heart sounds and intact distal pulses.   No murmur heard.  tachy   Pulmonary/Chest: Effort normal and breath sounds normal. No stridor. No respiratory distress. He has no wheezes. He has no rales.   Abdominal: Soft. Bowel sounds are normal. He exhibits no distension. There is no tenderness. There is no guarding.   Genitourinary:   Genitourinary Comments: ua neg   Musculoskeletal: Normal range of motion. He exhibits no edema, tenderness or deformity.   Neurological: He is alert and oriented to person, place, and time. No cranial nerve deficit.   Skin: Skin is warm and dry. Capillary refill takes  less than 2 seconds. No rash noted. He is not diaphoretic. No erythema.   Psychiatric: He has a normal mood and affect. His behavior is normal.   PEC'd  Unable to fully assess   Hyperstimulated/startled response with attempting to keep awake.    Nursing note and vitals reviewed.      Significant Labs: All pertinent labs within the past 24 hours have been reviewed.    Significant Imaging:   Imaging Results          CT Head Without Contrast (Final result)  Result time 11/14/18 15:03:07    Final result by Sushant Sorensen Jr., MD (11/14/18 15:03:07)                 Impression:      No acute intracranial findings evident.    All CT scans at this facility are performed  using dose modulation techniques as appropriate to performed exam including the following:  automated exposure control; adjustment of mA and/or kV according to the patients size (this includes techniques or standardized protocols for targeted exams where dose is matched to indication/reason for exam: i.e. extremities or head);  iterative reconstruction technique.      Electronically signed by: Sushant Sorensen MD  Date:    11/14/2018  Time:    15:03             Narrative:    EXAMINATION:  CT HEAD WITHOUT CONTRAST    CLINICAL HISTORY:  Confusion/delirium, altered LOC, unexplained;overdose;    TECHNIQUE:  Noncontrast axial images of the head were obtained.    COMPARISON:  08/28/2013    FINDINGS:  Ventricular system is normal.  No hydrocephalus.  No midline shift.  No abnormal density to indicate acute major vascular distribution ischemic infarction or hemorrhage.  No mass effect.  No extra-axial fluid collections.    Visualized paranasal sinuses and mastoid air cells appear clear.    No calvarial fracture.    No significant change.                               X-Ray Chest 1 View (Final result)  Result time 11/13/18 23:39:57    Final result by Alvino Chapa MD (11/13/18 23:39:57)                 Impression:      No acute cardiopulmonary  disease.      Electronically signed by: Alvino Chapa MD  Date:    11/13/2018  Time:    23:39             Narrative:    EXAMINATION:  XR CHEST 1 VIEW    CLINICAL HISTORY:  drug overdose;    COMPARISON:  None.    FINDINGS:  The lungs are clear. The cardiac silhouette is within normal limits. The bones are intact.                                  yes

## 2021-06-02 ENCOUNTER — HOSPITAL ENCOUNTER (EMERGENCY)
Facility: HOSPITAL | Age: 36
Discharge: PSYCHIATRIC OR SUBSTANCE USE FACILITY | End: 2021-06-03
Attending: FAMILY MEDICINE
Payer: MEDICAID

## 2021-06-02 DIAGNOSIS — R45.851 SUICIDAL IDEATION: Primary | ICD-10-CM

## 2021-06-02 LAB
ALBUMIN SERPL BCP-MCNC: 5 G/DL (ref 3.5–5.2)
ALP SERPL-CCNC: 64 U/L (ref 55–135)
ALT SERPL W/O P-5'-P-CCNC: 32 U/L (ref 10–44)
AMPHET+METHAMPHET UR QL: NEGATIVE
ANION GAP SERPL CALC-SCNC: 15 MMOL/L (ref 8–16)
APAP SERPL-MCNC: <3 UG/ML (ref 10–20)
AST SERPL-CCNC: 22 U/L (ref 10–40)
BARBITURATES UR QL SCN>200 NG/ML: NEGATIVE
BASOPHILS # BLD AUTO: 0.09 K/UL (ref 0–0.2)
BASOPHILS NFR BLD: 0.6 % (ref 0–1.9)
BENZODIAZ UR QL SCN>200 NG/ML: NEGATIVE
BILIRUB SERPL-MCNC: 0.3 MG/DL (ref 0.1–1)
BILIRUB UR QL STRIP: NEGATIVE
BUN SERPL-MCNC: 8 MG/DL (ref 6–20)
BZE UR QL SCN: NEGATIVE
CALCIUM SERPL-MCNC: 9.9 MG/DL (ref 8.7–10.5)
CANNABINOIDS UR QL SCN: NEGATIVE
CHLORIDE SERPL-SCNC: 103 MMOL/L (ref 95–110)
CLARITY UR: CLEAR
CO2 SERPL-SCNC: 21 MMOL/L (ref 23–29)
COLOR UR: YELLOW
CREAT SERPL-MCNC: 1.4 MG/DL (ref 0.5–1.4)
CREAT UR-MCNC: 189.1 MG/DL (ref 23–375)
CTP QC/QA: YES
DACRYOCYTES BLD QL SMEAR: ABNORMAL
DIFFERENTIAL METHOD: ABNORMAL
EOSINOPHIL # BLD AUTO: 0 K/UL (ref 0–0.5)
EOSINOPHIL NFR BLD: 0.1 % (ref 0–8)
ERYTHROCYTE [DISTWIDTH] IN BLOOD BY AUTOMATED COUNT: 12.9 % (ref 11.5–14.5)
EST. GFR  (AFRICAN AMERICAN): >60 ML/MIN/1.73 M^2
EST. GFR  (NON AFRICAN AMERICAN): >60 ML/MIN/1.73 M^2
ETHANOL SERPL-MCNC: 12 MG/DL
GLUCOSE SERPL-MCNC: 64 MG/DL (ref 70–110)
GLUCOSE UR QL STRIP: NEGATIVE
HCT VFR BLD AUTO: 52.4 % (ref 40–54)
HCV AB SERPL QL IA: NEGATIVE
HEP C VIRUS HOLD SPECIMEN: NORMAL
HGB BLD-MCNC: 17.3 G/DL (ref 14–18)
HGB UR QL STRIP: NEGATIVE
HIV 1+2 AB+HIV1 P24 AG SERPL QL IA: NEGATIVE
IMM GRANULOCYTES # BLD AUTO: 0.12 K/UL (ref 0–0.04)
IMM GRANULOCYTES NFR BLD AUTO: 0.8 % (ref 0–0.5)
KETONES UR QL STRIP: NEGATIVE
LEUKOCYTE ESTERASE UR QL STRIP: NEGATIVE
LYMPHOCYTES # BLD AUTO: 4.6 K/UL (ref 1–4.8)
LYMPHOCYTES NFR BLD: 31 % (ref 18–48)
MCH RBC QN AUTO: 31.1 PG (ref 27–31)
MCHC RBC AUTO-ENTMCNC: 33 G/DL (ref 32–36)
MCV RBC AUTO: 94 FL (ref 82–98)
METHADONE UR QL SCN>300 NG/ML: NEGATIVE
MONOCYTES # BLD AUTO: 1.2 K/UL (ref 0.3–1)
MONOCYTES NFR BLD: 7.9 % (ref 4–15)
NEUTROPHILS # BLD AUTO: 8.9 K/UL (ref 1.8–7.7)
NEUTROPHILS NFR BLD: 59.6 % (ref 38–73)
NITRITE UR QL STRIP: NEGATIVE
NRBC BLD-RTO: 0 /100 WBC
OPIATES UR QL SCN: NEGATIVE
PCP UR QL SCN>25 NG/ML: NORMAL
PH UR STRIP: 6 [PH] (ref 5–8)
PLATELET # BLD AUTO: 402 K/UL (ref 150–450)
PMV BLD AUTO: 8.6 FL (ref 9.2–12.9)
POLYCHROMASIA BLD QL SMEAR: ABNORMAL
POTASSIUM SERPL-SCNC: 4.2 MMOL/L (ref 3.5–5.1)
PROT SERPL-MCNC: 8.8 G/DL (ref 6–8.4)
PROT UR QL STRIP: NEGATIVE
RBC # BLD AUTO: 5.56 M/UL (ref 4.6–6.2)
SARS-COV-2 RDRP RESP QL NAA+PROBE: NEGATIVE
SODIUM SERPL-SCNC: 139 MMOL/L (ref 136–145)
SP GR UR STRIP: 1.02 (ref 1–1.03)
T4 FREE SERPL-MCNC: 1.02 NG/DL (ref 0.71–1.51)
TOXICOLOGY INFORMATION: NORMAL
TSH SERPL DL<=0.005 MIU/L-ACNC: 4.88 UIU/ML (ref 0.4–4)
URN SPEC COLLECT METH UR: NORMAL
UROBILINOGEN UR STRIP-ACNC: NEGATIVE EU/DL
WBC # BLD AUTO: 14.99 K/UL (ref 3.9–12.7)

## 2021-06-02 PROCEDURE — 99215 PR OFFICE/OUTPT VISIT, EST, LEVL V, 40-54 MIN: ICD-10-PCS | Mod: 95,AF,HB, | Performed by: PSYCHIATRY & NEUROLOGY

## 2021-06-02 PROCEDURE — U0002 COVID-19 LAB TEST NON-CDC: HCPCS | Performed by: FAMILY MEDICINE

## 2021-06-02 PROCEDURE — 99285 EMERGENCY DEPT VISIT HI MDM: CPT | Mod: 25

## 2021-06-02 PROCEDURE — 86703 HIV-1/HIV-2 1 RESULT ANTBDY: CPT | Performed by: FAMILY MEDICINE

## 2021-06-02 PROCEDURE — 36415 COLL VENOUS BLD VENIPUNCTURE: CPT | Performed by: FAMILY MEDICINE

## 2021-06-02 PROCEDURE — 86803 HEPATITIS C AB TEST: CPT | Performed by: FAMILY MEDICINE

## 2021-06-02 PROCEDURE — 80143 DRUG ASSAY ACETAMINOPHEN: CPT | Performed by: FAMILY MEDICINE

## 2021-06-02 PROCEDURE — 85025 COMPLETE CBC W/AUTO DIFF WBC: CPT | Performed by: FAMILY MEDICINE

## 2021-06-02 PROCEDURE — 82077 ASSAY SPEC XCP UR&BREATH IA: CPT | Performed by: FAMILY MEDICINE

## 2021-06-02 PROCEDURE — 84443 ASSAY THYROID STIM HORMONE: CPT | Performed by: FAMILY MEDICINE

## 2021-06-02 PROCEDURE — 80053 COMPREHEN METABOLIC PANEL: CPT | Performed by: FAMILY MEDICINE

## 2021-06-02 PROCEDURE — 80307 DRUG TEST PRSMV CHEM ANLYZR: CPT | Performed by: FAMILY MEDICINE

## 2021-06-02 PROCEDURE — 84439 ASSAY OF FREE THYROXINE: CPT | Performed by: FAMILY MEDICINE

## 2021-06-02 PROCEDURE — 99215 OFFICE O/P EST HI 40 MIN: CPT | Mod: 95,AF,HB, | Performed by: PSYCHIATRY & NEUROLOGY

## 2021-06-02 PROCEDURE — 81003 URINALYSIS AUTO W/O SCOPE: CPT | Mod: 59 | Performed by: FAMILY MEDICINE

## 2021-06-03 VITALS
WEIGHT: 270 LBS | HEIGHT: 72 IN | SYSTOLIC BLOOD PRESSURE: 133 MMHG | DIASTOLIC BLOOD PRESSURE: 84 MMHG | OXYGEN SATURATION: 97 % | HEART RATE: 91 BPM | BODY MASS INDEX: 36.57 KG/M2 | TEMPERATURE: 98 F | RESPIRATION RATE: 18 BRPM

## 2021-06-03 PROBLEM — D72.829 ELEVATED WBC COUNT: Status: ACTIVE | Noted: 2021-06-03

## 2021-06-03 PROBLEM — R79.89 ELEVATED TSH: Status: ACTIVE | Noted: 2021-06-03

## 2021-06-03 PROBLEM — F29 PSYCHOSIS: Status: ACTIVE | Noted: 2021-06-03

## 2025-03-21 ENCOUNTER — HOSPITAL ENCOUNTER (EMERGENCY)
Facility: HOSPITAL | Age: 40
Discharge: PSYCHIATRIC OR SUBSTANCE USE FACILITY | End: 2025-03-21
Attending: EMERGENCY MEDICINE
Payer: MEDICAID

## 2025-03-21 VITALS
SYSTOLIC BLOOD PRESSURE: 122 MMHG | RESPIRATION RATE: 16 BRPM | WEIGHT: 225 LBS | OXYGEN SATURATION: 97 % | DIASTOLIC BLOOD PRESSURE: 76 MMHG | TEMPERATURE: 98 F | HEIGHT: 72 IN | HEART RATE: 94 BPM | BODY MASS INDEX: 30.48 KG/M2

## 2025-03-21 DIAGNOSIS — F32.A DEPRESSION WITH SUICIDAL IDEATION: Primary | ICD-10-CM

## 2025-03-21 DIAGNOSIS — Z00.8 MEDICAL CLEARANCE FOR PSYCHIATRIC ADMISSION: ICD-10-CM

## 2025-03-21 DIAGNOSIS — R45.851 DEPRESSION WITH SUICIDAL IDEATION: Primary | ICD-10-CM

## 2025-03-21 LAB
ALBUMIN SERPL BCP-MCNC: 4 G/DL (ref 3.5–5.2)
ALP SERPL-CCNC: 48 U/L (ref 40–150)
ALT SERPL W/O P-5'-P-CCNC: 22 U/L (ref 10–44)
AMPHET+METHAMPHET UR QL: NEGATIVE
ANION GAP SERPL CALC-SCNC: 10 MMOL/L (ref 8–16)
APAP SERPL-MCNC: <3 UG/ML (ref 10–20)
AST SERPL-CCNC: 22 U/L (ref 10–40)
BARBITURATES UR QL SCN>200 NG/ML: NEGATIVE
BASOPHILS # BLD AUTO: 0.07 K/UL (ref 0–0.2)
BASOPHILS NFR BLD: 0.7 % (ref 0–1.9)
BENZODIAZ UR QL SCN>200 NG/ML: NEGATIVE
BILIRUB SERPL-MCNC: 0.3 MG/DL (ref 0.1–1)
BILIRUB UR QL STRIP: NEGATIVE
BUN SERPL-MCNC: 13 MG/DL (ref 6–20)
BZE UR QL SCN: NEGATIVE
CALCIUM SERPL-MCNC: 9 MG/DL (ref 8.7–10.5)
CANNABINOIDS UR QL SCN: ABNORMAL
CHLORIDE SERPL-SCNC: 109 MMOL/L (ref 95–110)
CLARITY UR: CLEAR
CO2 SERPL-SCNC: 21 MMOL/L (ref 23–29)
COLOR UR: YELLOW
CREAT SERPL-MCNC: 0.9 MG/DL (ref 0.5–1.4)
CREAT UR-MCNC: 274.6 MG/DL (ref 23–375)
DIFFERENTIAL METHOD BLD: ABNORMAL
EOSINOPHIL # BLD AUTO: 0.2 K/UL (ref 0–0.5)
EOSINOPHIL NFR BLD: 2 % (ref 0–8)
ERYTHROCYTE [DISTWIDTH] IN BLOOD BY AUTOMATED COUNT: 12.4 % (ref 11.5–14.5)
EST. GFR  (NO RACE VARIABLE): >60 ML/MIN/1.73 M^2
ETHANOL SERPL-MCNC: <10 MG/DL
GLUCOSE SERPL-MCNC: 111 MG/DL (ref 70–110)
GLUCOSE UR QL STRIP: NEGATIVE
HCT VFR BLD AUTO: 42.1 % (ref 40–54)
HGB BLD-MCNC: 13.9 G/DL (ref 14–18)
HGB UR QL STRIP: NEGATIVE
IMM GRANULOCYTES # BLD AUTO: 0.08 K/UL (ref 0–0.04)
IMM GRANULOCYTES NFR BLD AUTO: 0.8 % (ref 0–0.5)
KETONES UR QL STRIP: NEGATIVE
LEUKOCYTE ESTERASE UR QL STRIP: ABNORMAL
LYMPHOCYTES # BLD AUTO: 2.9 K/UL (ref 1–4.8)
LYMPHOCYTES NFR BLD: 30.2 % (ref 18–48)
MCH RBC QN AUTO: 30.8 PG (ref 27–31)
MCHC RBC AUTO-ENTMCNC: 33 G/DL (ref 32–36)
MCV RBC AUTO: 93 FL (ref 82–98)
METHADONE UR QL SCN>300 NG/ML: NEGATIVE
MICROSCOPIC COMMENT: ABNORMAL
MONOCYTES # BLD AUTO: 1 K/UL (ref 0.3–1)
MONOCYTES NFR BLD: 10.2 % (ref 4–15)
NEUTROPHILS # BLD AUTO: 5.4 K/UL (ref 1.8–7.7)
NEUTROPHILS NFR BLD: 56.1 % (ref 38–73)
NITRITE UR QL STRIP: NEGATIVE
NRBC BLD-RTO: 0 /100 WBC
OPIATES UR QL SCN: NEGATIVE
PCP UR QL SCN>25 NG/ML: NEGATIVE
PH UR STRIP: 6 [PH] (ref 5–8)
PLATELET # BLD AUTO: 286 K/UL (ref 150–450)
PMV BLD AUTO: 9 FL (ref 9.2–12.9)
POTASSIUM SERPL-SCNC: 4 MMOL/L (ref 3.5–5.1)
PROT SERPL-MCNC: 6.7 G/DL (ref 6–8.4)
PROT UR QL STRIP: ABNORMAL
RBC # BLD AUTO: 4.51 M/UL (ref 4.6–6.2)
SARS-COV-2 RDRP RESP QL NAA+PROBE: NEGATIVE
SODIUM SERPL-SCNC: 140 MMOL/L (ref 136–145)
SP GR UR STRIP: 1.02 (ref 1–1.03)
TOXICOLOGY INFORMATION: ABNORMAL
URN SPEC COLLECT METH UR: ABNORMAL
UROBILINOGEN UR STRIP-ACNC: NEGATIVE EU/DL
WBC # BLD AUTO: 9.67 K/UL (ref 3.9–12.7)
WBC #/AREA URNS HPF: 12 /HPF (ref 0–5)
WBC CLUMPS URNS QL MICRO: ABNORMAL

## 2025-03-21 PROCEDURE — 87088 URINE BACTERIA CULTURE: CPT | Performed by: EMERGENCY MEDICINE

## 2025-03-21 PROCEDURE — 82077 ASSAY SPEC XCP UR&BREATH IA: CPT | Performed by: EMERGENCY MEDICINE

## 2025-03-21 PROCEDURE — 87086 URINE CULTURE/COLONY COUNT: CPT | Performed by: EMERGENCY MEDICINE

## 2025-03-21 PROCEDURE — 81000 URINALYSIS NONAUTO W/SCOPE: CPT | Mod: 59 | Performed by: EMERGENCY MEDICINE

## 2025-03-21 PROCEDURE — 85025 COMPLETE CBC W/AUTO DIFF WBC: CPT | Performed by: EMERGENCY MEDICINE

## 2025-03-21 PROCEDURE — 80143 DRUG ASSAY ACETAMINOPHEN: CPT | Performed by: EMERGENCY MEDICINE

## 2025-03-21 PROCEDURE — 87635 SARS-COV-2 COVID-19 AMP PRB: CPT | Performed by: EMERGENCY MEDICINE

## 2025-03-21 PROCEDURE — 80307 DRUG TEST PRSMV CHEM ANLYZR: CPT | Performed by: EMERGENCY MEDICINE

## 2025-03-21 PROCEDURE — 99285 EMERGENCY DEPT VISIT HI MDM: CPT

## 2025-03-21 PROCEDURE — 80053 COMPREHEN METABOLIC PANEL: CPT | Performed by: EMERGENCY MEDICINE

## 2025-03-21 RX ORDER — QUETIAPINE FUMARATE 200 MG/1
200 TABLET, FILM COATED ORAL NIGHTLY
Status: ON HOLD | COMMUNITY
End: 2025-03-27

## 2025-03-21 RX ORDER — OLANZAPINE 10 MG/1
TABLET ORAL NIGHTLY
Status: ON HOLD | COMMUNITY
Start: 2025-03-18 | End: 2025-03-27 | Stop reason: HOSPADM

## 2025-03-21 RX ORDER — DIVALPROEX SODIUM 500 MG/1
500 TABLET, FILM COATED, EXTENDED RELEASE ORAL 2 TIMES DAILY
Status: ON HOLD | COMMUNITY
End: 2025-03-27 | Stop reason: HOSPADM

## 2025-03-21 RX ORDER — DIVALPROEX SODIUM 250 MG/1
250 TABLET, FILM COATED, EXTENDED RELEASE ORAL NIGHTLY
Status: ON HOLD | COMMUNITY
End: 2025-03-27 | Stop reason: HOSPADM

## 2025-03-21 RX ORDER — BENZTROPINE MESYLATE 0.5 MG/1
0.5 TABLET ORAL NIGHTLY
Status: ON HOLD | COMMUNITY
Start: 2025-03-18 | End: 2025-03-27 | Stop reason: HOSPADM

## 2025-03-21 RX ORDER — ARIPIPRAZOLE 10 MG/1
10 TABLET ORAL
Status: ON HOLD | COMMUNITY
End: 2025-03-27 | Stop reason: HOSPADM

## 2025-03-21 RX ORDER — DEXTROAMPHETAMINE SACCHARATE, AMPHETAMINE ASPARTATE, DEXTROAMPHETAMINE SULFATE AND AMPHETAMINE SULFATE 3.75; 3.75; 3.75; 3.75 MG/1; MG/1; MG/1; MG/1
15 TABLET ORAL
COMMUNITY

## 2025-03-21 NOTE — ED NOTES
Pt resting in ER psych stretcher with eyes closed, snoring rr e/u, NAD noted. Pt remains in maroon scrubs with yellow socks per protocol. Bed low and locked. PSA at bs performing direct observation at this time. Will continue to monitor.

## 2025-03-21 NOTE — ED NOTES
Pt belongings:      Clear bag-underwear, shorts, pants, tshirt, hoodie, cap, nike coat, socks, Samsung phone    Clear bag-two tennis shoes, two boots    Backpack-insurance card, [facility] discharge paperwork, pants, shirts, shorts, socks, charging cable

## 2025-03-21 NOTE — ED PROVIDER NOTES
SCRIBE #1 NOTE: I, Hailey Melgoza, am scribing for, and in the presence of, Frederick Fabian Jr., MD. I have scribed the entire note.       History     Chief Complaint   Patient presents with    Suicidal     Pt reports increased depression and suicidal thoughts, states he recently was admitted at [facility]. Hx Bipolar      Review of patient's allergies indicates:   Allergen Reactions    Risperdal [risperidone] Other (See Comments)     gynecomastica         History of Present Illness     HPI    3/21/2025, 5:04 AM  History obtained from the patient and medical records      History of Present Illness: Dimitrios Arzola is a 39 y.o. male patient with a PMHx of bipolar disorder and ADHD who presents to the Emergency Department for psychiatric evaluation. Pt states he is currently homeless due to problems at home with his family. Pt states he's been getting more depressed to the point of wanting to end his own life. No specific plan mentioned. Denies EtOH use, but reports marijuana use earlier today. Denies HI. No further complaints or concerns at this time.       Arrival mode: Ambulance Service    PCP: No, Primary Doctor        Past Medical History:  Past Medical History:   Diagnosis Date    ADHD (attention deficit hyperactivity disorder)     Alcohol abuse     Anxiety     Bipolar affective     Depression     History of psychiatric hospitalization     Hx of psychiatric care     Medical clearance for psychiatric admission 3/21/2025    Overdose 10/2018    ambien and chloracedin D    Psychiatric problem     Psychosis 10/18/2017    Schizoaffective disorder, bipolar type with psychotic features 11/14/2018    Therapy        Past Surgical History:  Past Surgical History:   Procedure Laterality Date    ANKLE SURGERY      right         Family History:  Family History   Family history unknown: Yes       Social History:  Social History     Tobacco Use    Smoking status: Every Day     Current packs/day: 0.50     Types:  Cigarettes    Smokeless tobacco: Never   Substance and Sexual Activity    Alcohol use: Yes     Comment: Vodka occasionally     Drug use: Yes     Types: Marijuana    Sexual activity: Yes     Partners: Female     Birth control/protection: None        Review of Systems     Review of Systems   Constitutional:  Negative for fever.   HENT:  Negative for sore throat.    Respiratory:  Negative for shortness of breath.    Cardiovascular:  Negative for chest pain.   Gastrointestinal:  Negative for nausea.   Genitourinary:  Negative for dysuria.   Musculoskeletal:  Negative for back pain.   Skin:  Negative for rash.   Neurological:  Negative for weakness.   Hematological:  Does not bruise/bleed easily.   Psychiatric/Behavioral:  Positive for suicidal ideas. Negative for hallucinations.         (-) HI  (-) Delusions   All other systems reviewed and are negative.       Physical Exam     Initial Vitals [03/21/25 0433]   BP Pulse Resp Temp SpO2   (!) 144/84 104 18 98.4 °F (36.9 °C) 97 %      MAP       --          Physical Exam  Nursing Notes and Vital Signs Reviewed.  Constitutional: Patient is in no acute distress. Well-developed and well-nourished.  Head: Atraumatic. Normocephalic.  Eyes: PERRL. EOM intact. Conjunctivae are not pale. No scleral icterus.  ENT: Mucous membranes are dry. Oropharynx is clear and symmetric.    Neck: Supple. Full ROM. No lymphadenopathy.  Cardiovascular: Regular rate. Regular rhythm. No murmurs, rubs, or gallops. Distal pulses are 2+ and symmetric.  Pulmonary/Chest: No respiratory distress. Clear to auscultation bilaterally. No wheezing or rales.  Abdominal: Soft and non-distended.  There is no tenderness.  No rebound, guarding, or rigidity.  Genitourinary: No CVA tenderness.  Musculoskeletal: Moves all extremities. No obvious deformities. No edema.  Skin: Warm and dry.  Neurological:  Alert, awake, and appropriate.  Normal speech.  No acute focal neurological deficits are appreciated.  Psychiatric:  Depressed. +SI. -HI. Thought content is blocked. Flat affect. No hallucinations or delusions.     ED Course   Procedures  ED Vital Signs:  Vitals:    03/21/25 0433   BP: (!) 144/84   Pulse: 104   Resp: 18   Temp: 98.4 °F (36.9 °C)   TempSrc: Oral   SpO2: 97%   Height: 6' (1.829 m)       Abnormal Lab Results:  Labs Reviewed   CBC W/ AUTO DIFFERENTIAL - Abnormal       Result Value    WBC 9.67      RBC 4.51 (*)     Hemoglobin 13.9 (*)     Hematocrit 42.1      MCV 93      MCH 30.8      MCHC 33.0      RDW 12.4      Platelets 286      MPV 9.0 (*)     Immature Granulocytes 0.8 (*)     Gran # (ANC) 5.4      Immature Grans (Abs) 0.08 (*)     Lymph # 2.9      Mono # 1.0      Eos # 0.2      Baso # 0.07      nRBC 0      Gran % 56.1      Lymph % 30.2      Mono % 10.2      Eosinophil % 2.0      Basophil % 0.7      Differential Method Automated     COMPREHENSIVE METABOLIC PANEL - Abnormal    Sodium 140      Potassium 4.0      Chloride 109      CO2 21 (*)     Glucose 111 (*)     BUN 13      Creatinine 0.9      Calcium 9.0      Total Protein 6.7      Albumin 4.0      Total Bilirubin 0.3      Alkaline Phosphatase 48      AST 22      ALT 22      eGFR >60      Anion Gap 10     URINALYSIS, REFLEX TO URINE CULTURE - Abnormal    Specimen UA Urine, Clean Catch      Color, UA Yellow      Appearance, UA Clear      pH, UA 6.0      Specific Gravity, UA 1.025      Protein, UA Trace (*)     Glucose, UA Negative      Ketones, UA Negative      Bilirubin (UA) Negative      Occult Blood UA Negative      Nitrite, UA Negative      Urobilinogen, UA Negative      Leukocytes, UA 1+ (*)     Narrative:     Specimen Source->Urine   DRUG SCREEN PANEL, URINE EMERGENCY - Abnormal    Benzodiazepines Negative      Methadone metabolites Negative      Cocaine (Metab.) Negative      Opiate Scrn, Ur Negative      Barbiturate Screen, Ur Negative      Amphetamine Screen, Ur Negative      THC Presumptive Positive (*)     Phencyclidine Negative      Creatinine, Urine  274.6      Toxicology Information SEE COMMENT      Narrative:     Specimen Source->Urine   ACETAMINOPHEN LEVEL - Abnormal    Acetaminophen (Tylenol), Serum <3.0 (*)    URINALYSIS MICROSCOPIC - Abnormal    WBC, UA 12 (*)     WBC Clumps, UA Occasional (*)     Microscopic Comment SEE COMMENT      Narrative:     Specimen Source->Urine   CULTURE, URINE   ALCOHOL,MEDICAL (ETHANOL)    Alcohol, Serum <10     SARS-COV-2 RNA AMPLIFICATION, QUAL    SARS-CoV-2 RNA, Amplification, Qual Negative          All Lab Results:  Results for orders placed or performed during the hospital encounter of 03/21/25   Urinalysis, Reflex to Urine Culture Urine, Clean Catch    Collection Time: 03/21/25  4:49 AM    Specimen: Urine   Result Value Ref Range    Specimen UA Urine, Clean Catch     Color, UA Yellow Yellow, Straw, Geovanna    Appearance, UA Clear Clear    pH, UA 6.0 5.0 - 8.0    Specific Gravity, UA 1.025 1.005 - 1.030    Protein, UA Trace (A) Negative    Glucose, UA Negative Negative    Ketones, UA Negative Negative    Bilirubin (UA) Negative Negative    Occult Blood UA Negative Negative    Nitrite, UA Negative Negative    Urobilinogen, UA Negative <2.0 EU/dL    Leukocytes, UA 1+ (A) Negative   Drug screen panel, emergency    Collection Time: 03/21/25  4:49 AM   Result Value Ref Range    Benzodiazepines Negative Negative    Methadone metabolites Negative Negative    Cocaine (Metab.) Negative Negative    Opiate Scrn, Ur Negative Negative    Barbiturate Screen, Ur Negative Negative    Amphetamine Screen, Ur Negative Negative    THC Presumptive Positive (A) Negative    Phencyclidine Negative Negative    Creatinine, Urine 274.6 23.0 - 375.0 mg/dL    Toxicology Information SEE COMMENT    Urinalysis Microscopic    Collection Time: 03/21/25  4:49 AM   Result Value Ref Range    WBC, UA 12 (H) 0 - 5 /hpf    WBC Clumps, UA Occasional (A) None-Rare    Microscopic Comment SEE COMMENT    COVID-19 Rapid Screening    Collection Time: 03/21/25  4:53 AM    Result Value Ref Range    SARS-CoV-2 RNA, Amplification, Qual Negative Negative   CBC auto differential    Collection Time: 03/21/25  5:10 AM   Result Value Ref Range    WBC 9.67 3.90 - 12.70 K/uL    RBC 4.51 (L) 4.60 - 6.20 M/uL    Hemoglobin 13.9 (L) 14.0 - 18.0 g/dL    Hematocrit 42.1 40.0 - 54.0 %    MCV 93 82 - 98 fL    MCH 30.8 27.0 - 31.0 pg    MCHC 33.0 32.0 - 36.0 g/dL    RDW 12.4 11.5 - 14.5 %    Platelets 286 150 - 450 K/uL    MPV 9.0 (L) 9.2 - 12.9 fL    Immature Granulocytes 0.8 (H) 0.0 - 0.5 %    Gran # (ANC) 5.4 1.8 - 7.7 K/uL    Immature Grans (Abs) 0.08 (H) 0.00 - 0.04 K/uL    Lymph # 2.9 1.0 - 4.8 K/uL    Mono # 1.0 0.3 - 1.0 K/uL    Eos # 0.2 0.0 - 0.5 K/uL    Baso # 0.07 0.00 - 0.20 K/uL    nRBC 0 0 /100 WBC    Gran % 56.1 38.0 - 73.0 %    Lymph % 30.2 18.0 - 48.0 %    Mono % 10.2 4.0 - 15.0 %    Eosinophil % 2.0 0.0 - 8.0 %    Basophil % 0.7 0.0 - 1.9 %    Differential Method Automated    Comprehensive metabolic panel    Collection Time: 03/21/25  5:10 AM   Result Value Ref Range    Sodium 140 136 - 145 mmol/L    Potassium 4.0 3.5 - 5.1 mmol/L    Chloride 109 95 - 110 mmol/L    CO2 21 (L) 23 - 29 mmol/L    Glucose 111 (H) 70 - 110 mg/dL    BUN 13 6 - 20 mg/dL    Creatinine 0.9 0.5 - 1.4 mg/dL    Calcium 9.0 8.7 - 10.5 mg/dL    Total Protein 6.7 6.0 - 8.4 g/dL    Albumin 4.0 3.5 - 5.2 g/dL    Total Bilirubin 0.3 0.1 - 1.0 mg/dL    Alkaline Phosphatase 48 40 - 150 U/L    AST 22 10 - 40 U/L    ALT 22 10 - 44 U/L    eGFR >60 >60 mL/min/1.73 m^2    Anion Gap 10 8 - 16 mmol/L   Ethanol    Collection Time: 03/21/25  5:10 AM   Result Value Ref Range    Alcohol, Serum <10 <10 mg/dL   Acetaminophen level    Collection Time: 03/21/25  5:10 AM   Result Value Ref Range    Acetaminophen (Tylenol), Serum <3.0 (L) 10.0 - 20.0 ug/mL       Imaging Results:  Imaging Results    None          No EKG was ordered.           The Emergency Provider reviewed the vital signs and test results, which are outlined  above.     ED Discussion     5:05 AM: The PEC hold has been issued by Dr. Fabian at this time for the following: being dangerous to self, unable to seek voluntary admission, and gravely disabled.    5:47 AM: Pt has been medically cleared by Dr. Fabian at this time. Reassessed pt at this time. Pt is resting comfortably and appears in no acute distress. There are no psychiatric services offered at this facility. D/w pt all pertinent ED information and plan to transfer to psychiatric facility for psychiatric treatment. Pt verbalizes understanding. Patient being transferred by [facility] for ongoing personal protection en route. Pt has been made aware of all risks and benefits associated with transfer, including but not limited to death, MVC, loss of vital signs, and/or permanent disability. Benefits include ability to be treated at an inpatient psychiatric facility. Pt will be transported by personnel trained in CPR and CPI. Patient understands that there could be unforeseen motor vehicle accidents, inclement weather, or loss of vital signs that could result in potential death or permanent disability. All questions and complaints have been addressed at this time. Pt condition is stable at this time and is clear to transfer to psychiatric facility at this time.         Medical Decision Making  Amount and/or Complexity of Data Reviewed  Labs: ordered. Decision-making details documented in ED Course.    Risk  OTC drugs.  Prescription drug management.  Parenteral controlled substances.  Decision regarding hospitalization.  Risk Details: OTC drugs, prescription drugs and controlled substances considered.  Due to patient's symptoms improving and pain controlled pain medications ordered appropriately.  Differential diagnoses:  CVA, infection, pneumonia, urinary tract infection, intra-abdominal pathology, renal failure, dehydration, electrolyte abnormality or metabolic cause y, drug affect, hyperglycemia hypoglycemia, drug  drug interaction, psychiatric illness, meningitis encephalitis, seizure, vasculitis, heart failure, arrhythmia, endocarditis                  ED Medication(s):  Medications - No data to display    New Prescriptions    No medications on file               Scribe Attestation:   Scribe #1: I performed the above scribed service and the documentation accurately describes the services I performed. I attest to the accuracy of the note.     Attending:   Physician Attestation Statement for Scribe #1: I, Frederick Fabian Jr., MD, personally performed the services described in this documentation, as scribed by Hailey Melgoza, in my presence, and it is both accurate and complete.           Clinical Impression       ICD-10-CM ICD-9-CM   1. Depression with suicidal ideation  F32.A 311    R45.851 V62.84   2. Medical clearance for psychiatric admission  Z00.8 V70.8       Disposition:   Disposition: Transferred  Condition: Stable       Frederick Fabian Jr., MD  03/21/25 0627

## 2025-03-22 PROBLEM — R82.90 ABNORMAL URINALYSIS: Status: ACTIVE | Noted: 2025-03-22

## 2025-03-22 PROBLEM — Z13.9 ENCOUNTER FOR MEDICAL SCREENING EXAMINATION: Status: ACTIVE | Noted: 2025-03-21

## 2025-03-22 LAB — BACTERIA UR CULT: ABNORMAL

## 2025-03-23 ENCOUNTER — RESULTS FOLLOW-UP (OUTPATIENT)
Dept: EMERGENCY MEDICINE | Facility: HOSPITAL | Age: 40
End: 2025-03-23

## 2025-03-27 PROBLEM — F31.4 BIPOLAR DISORDER, CURRENT EPISODE DEPRESSED, SEVERE: Status: ACTIVE | Noted: 2025-03-27

## 2025-04-24 ENCOUNTER — HOSPITAL ENCOUNTER (EMERGENCY)
Facility: HOSPITAL | Age: 40
Discharge: LEFT BEFORE DISCHARGE | End: 2025-04-24
Attending: EMERGENCY MEDICINE
Payer: MEDICAID

## 2025-04-24 VITALS
HEART RATE: 100 BPM | DIASTOLIC BLOOD PRESSURE: 100 MMHG | RESPIRATION RATE: 18 BRPM | OXYGEN SATURATION: 97 % | TEMPERATURE: 98 F | SYSTOLIC BLOOD PRESSURE: 167 MMHG

## 2025-04-24 DIAGNOSIS — R41.82 ALTERED MENTAL STATUS, UNSPECIFIED ALTERED MENTAL STATUS TYPE: Primary | ICD-10-CM

## 2025-04-24 PROCEDURE — 99283 EMERGENCY DEPT VISIT LOW MDM: CPT

## 2025-04-25 ENCOUNTER — HOSPITAL ENCOUNTER (EMERGENCY)
Facility: HOSPITAL | Age: 40
Discharge: HOME | End: 2025-04-25
Attending: EMERGENCY MEDICINE
Payer: MEDICAID

## 2025-04-25 VITALS
RESPIRATION RATE: 18 BRPM | DIASTOLIC BLOOD PRESSURE: 85 MMHG | HEART RATE: 85 BPM | SYSTOLIC BLOOD PRESSURE: 135 MMHG | TEMPERATURE: 99 F | OXYGEN SATURATION: 97 %

## 2025-04-25 DIAGNOSIS — M79.672 PAIN IN BOTH FEET: Primary | ICD-10-CM

## 2025-04-25 DIAGNOSIS — M79.671 PAIN IN BOTH FEET: Primary | ICD-10-CM

## 2025-04-25 PROCEDURE — 99281 EMR DPT VST MAYX REQ PHY/QHP: CPT

## 2025-04-25 NOTE — ED PROVIDER NOTES
SCRIBE #1 NOTE: I, Ricky Cavanaugh, am scribing for, and in the presence of, Siddhartha Salcedo DO. I have scribed the entire note.       History     Chief Complaint   Patient presents with    Loss of Consciousness     Patient was found passed out outside of [facility] after walking there. He states he has been walking around and was diaphoretic and confused on [facility] arrival. Drug of choice is cough medicine and last used yesterday.     Review of patient's allergies indicates:   Allergen Reactions    Risperdal [risperidone] Other (See Comments)     gynecomastica         History of Present Illness     HPI    4/24/2025, 8:27 PM  History obtained from the patient, medical records, and [facility]      History of Present Illness: Dimitrios Arzola is a 39 y.o. male patient with a PMHx of ADHD, EtOH abuse, drug abuse, bipolar affective, depression, psychiatric care, overdose on Ambien and coricidin D, Schizoaffective disorder, and psychosis who presents to the Emergency Department for evaluation of LOC. Per [facility], pt was found unconsciousness outside the [facility] after walking there. Per [facility], pt was tachycardic, hypertensive,diaphoretic, and confused on scene. [facility] reports that pt drug of choice is cough medicine and they state he last used it yesterday. Pt was found with a zip lock bag full of cough and cold HBP in his possession. Symptoms are no longer present.  No mitigating or exacerbating factors reported. No prior Tx specified.  No further complaints or concerns at this time.       Arrival mode: [facility]    PCP: No, Primary Doctor        Past Medical History:  Past Medical History:   Diagnosis Date    ADHD (attention deficit hyperactivity disorder)     Alcohol abuse     Anxiety     Bipolar affective     Depression     History of psychiatric hospitalization     Hx of psychiatric care     Medical clearance for psychiatric admission 3/21/2025    Overdose 10/2018    ambien and chloracedin D     Psychiatric problem     Psychosis 10/18/2017    Schizoaffective disorder, bipolar type with psychotic features 11/14/2018    Therapy        Past Surgical History:  Past Surgical History:   Procedure Laterality Date    ANKLE SURGERY      right         Family History:  Family History   Family history unknown: Yes       Social History:  Social History     Tobacco Use    Smoking status: Every Day     Current packs/day: 0.50     Types: Cigarettes    Smokeless tobacco: Never   Substance and Sexual Activity    Alcohol use: Yes     Comment: Vodka occasionally     Drug use: Yes     Types: Marijuana    Sexual activity: Yes     Partners: Female     Birth control/protection: None        Review of Systems     As noted in HPI.  Review of Systems   Psychiatric/Behavioral:  Negative for suicidal ideas.         Denies homicidal ideation        Physical Exam     Initial Vitals [04/24/25 1947]   BP Pulse Resp Temp SpO2   (!) 167/100 100 18 98.3 °F (36.8 °C) 97 %      MAP       --          Physical Exam  Constitutional:       Comments: The patient is not letting me perform much of a physical exam on him.  He seems very upset that he is at the hospital   Musculoskeletal:      Comments: Patient is able to move all 4 extremities with no obvious deformity   Neurological:      General: No focal deficit present.      Mental Status: He is alert and oriented to person, place, and time.      Comments: He is alert and oriented x3 and perfectly capable of making all medical decisions   Psychiatric:         Thought Content: Thought content normal.         Judgment: Judgment normal.         Nursing Notes and Vital Signs Reviewed.     ED Course   Procedures  ED Vital Signs:  Vitals:    04/24/25 1947   BP: (!) 167/100   Pulse: 100   Resp: 18   Temp: 98.3 °F (36.8 °C)   TempSrc: Oral   SpO2: 97%       Abnormal Lab Results:  Labs Reviewed - No data to display       All Lab Results:  None.    Imaging Results:  Imaging Results    None                The  Emergency Provider reviewed the vital signs and test results, which are outlined above.     ED Discussion     8:39 PM: Pt is A&O x3, appropriate, and of capacity at this time. Pt voices desire to leave hospital. D/w pt in length need for further evaluation and treatment due to HPI and PEx. Pt declines any further evaluation or tx at this time. All risks, including worsening sx, permanent bodily harm and death, were discussed in length. Pt acknowledges all risk at this time and agrees to sign AMA form. Pt given RTER instructions. All questions and concerns addressed at this time. Pt leaving AMA.      Medical Decision Making  Differential diagnoses: electrolyte abnormality, infection, anxiety, panic disorder, acute psychosis, malingering, bipolar disorder posttraumatic stress disorder, Orthostatic hypotension, vasovagal syncope, carotid sinus syndrome, cardiac syncope including myocardial infarction pulmonary embolism aortic stenosis bradyarrhythmias tachyarrhythmias, hypoglycemia, seizure, transient ischemic attack, psychiatric conditions,     Discussed death or permanent disability and the patient states he wants to leave against medical advice.  He does state that he denies suicidal or homicidal ideations.  We discussed inability to care for himself or to perform his usual activities of daily life for the rest of his life and he still states that he wants to leave the hospital.  He is able to tolerate fluids and ambulate around the department with no difficulty or assistance.  He is instructed to return immediately for any new or worsening symptoms or if at any time he has any 2nd thoughts about his decision to leave against medical advice and he verbalized understanding.                ED Medication(s):  Medications - No data to display    Discharge Medication List as of 4/24/2025  9:13 PM                  Scribe Attestation:   Scribe #1: I performed the above scribed service and the documentation accurately  describes the services I performed. I attest to the accuracy of the note.     Attending:   Physician Attestation Statement for Scribe #1: I, Siddhartha Salcedo DO., personally performed the services described in this documentation, as scribed by Ricky Cavanaugh, in my presence, and it is both accurate and complete.           Clinical Impression       ICD-10-CM ICD-9-CM   1. Altered mental status, unspecified altered mental status type  R41.82 780.97       Disposition:   Disposition: AMA  Condition: Stable        Siddhartha Salcedo DO  04/25/25 1143

## 2025-04-25 NOTE — ED PROVIDER NOTES
SCRIBE #1 NOTE: I, Maliha Romero, am scribing for, and in the presence of, Juan Russell MD. I have scribed the entire note.       History     Chief Complaint   Patient presents with    Foot Pain     Pt states he has been walking all day and his feet are hurting due to him living in [place] and having to walk everywhere because he has no place to go. [facility]. Pt has psyc hx but denies SI,HI, AH,VH today.      Review of patient's allergies indicates:   Allergen Reactions    Risperdal [risperidone] Other (See Comments)     gynecomastica         History of Present Illness     HPI    4/25/2025, 4:24 AM  History obtained from the patient and medical records      History of Present Illness: Dimitrios Arzola is a 39 y.o. male patient with a PMHx of alcohol abuse, Depression, overdose, anxiety, and a history of psychiatric hospitalization who presents to the Emergency Department for evaluation of foot pain which began today. Symptoms are constant and moderate in severity. No prior Tx specified.  No further complaints or concerns at this time.       Arrival mode:  Other    PCP: No, Primary Doctor        Past Medical History:  Past Medical History:   Diagnosis Date    ADHD (attention deficit hyperactivity disorder)     Alcohol abuse     Anxiety     Bipolar affective     Depression     History of psychiatric hospitalization     Hx of psychiatric care     Medical clearance for psychiatric admission 3/21/2025    Overdose 10/2018    ambien and chloracedin D    Psychiatric problem     Psychosis 10/18/2017    Schizoaffective disorder, bipolar type with psychotic features 11/14/2018    Therapy        Past Surgical History:  Past Surgical History:   Procedure Laterality Date    ANKLE SURGERY      right         Family History:  Family History   Family history unknown: Yes       Social History:  Social History     Tobacco Use    Smoking status: Every Day     Current  packs/day: 0.50     Types: Cigarettes    Smokeless tobacco: Never   Substance and Sexual Activity    Alcohol use: Yes     Comment: Vodka occasionally     Drug use: Yes     Types: Marijuana    Sexual activity: Yes     Partners: Female     Birth control/protection: None        Review of Systems     Review of Systems   Constitutional:  Negative for fever.   HENT:  Negative for sore throat.    Respiratory:  Negative for shortness of breath.    Cardiovascular:  Negative for chest pain.   Gastrointestinal:  Negative for nausea.   Genitourinary:  Negative for dysuria.   Musculoskeletal:  Positive for myalgias (foot). Negative for back pain.   Skin:  Negative for rash.   Neurological:  Negative for weakness.   Hematological:  Does not bruise/bleed easily.   All other systems reviewed and are negative.       Physical Exam     Initial Vitals [04/25/25 0049]   BP Pulse Resp Temp SpO2   (!) 147/89 90 19 97.8 °F (36.6 °C) 99 %      MAP       --          Physical Exam  Nursing Notes and Vital Signs Reviewed.  Constitutional: Patient is in no acute distress. Well-developed and well-nourished.  Head: Atraumatic. Normocephalic.  Eyes: PERRL. EOM intact. Conjunctivae are not pale. No scleral icterus.  ENT: Mucous membranes are moist. Oropharynx is clear and symmetric.    Neck: Supple. Full ROM. No lymphadenopathy.  Cardiovascular: Regular rate. Regular rhythm. No murmurs, rubs, or gallops. Distal pulses are 2+ and symmetric.  Pulmonary/Chest: No respiratory distress. Clear to auscultation bilaterally. No wheezing or rales.  Abdominal: Soft and non-distended.  There is no tenderness.  No rebound, guarding, or rigidity. Good bowel sounds.  Genitourinary: No CVA tenderness.  Musculoskeletal: Moves all extremities. No obvious deformities. No edema. No calf tenderness.  Skin: Warm and dry.  Neurological:  Alert, awake, and appropriate.  Normal speech.  No acute focal neurological deficits are appreciated.  Psychiatric: Normal affect. Good  eye contact. Appropriate in content.     ED Course   Procedures  ED Vital Signs:  Vitals:    04/25/25 0049 04/25/25 0430   BP: (!) 147/89 135/85   Pulse: 90 85   Resp: 19 18   Temp: 97.8 °F (36.6 °C) 98.7 °F (37.1 °C)   TempSrc: Oral    SpO2: 99% 97%       Abnormal Lab Results:  Labs Reviewed - No data to display     All Lab Results:  None.    Imaging Results:  Imaging Results    None           ED Discussion     4:32 AM: Reassessed pt at this time. Discussed with patient and/or family/caretaker all pertinent ED information and results. Discussed pt dx and plan of tx. Gave the patient all f/u and return to the ED instructions. All questions and concerns were addressed at this time. Patient and/or family/caretaker expresses understanding of information and instructions, and is comfortable with plan to discharge. Pt is stable for discharge.     I discussed with patient and/or family/caretaker that evaluation in the ED does not suggest any emergent or life threatening medical conditions requiring immediate intervention beyond what was provided in the ED, and I believe patient is safe for discharge.  Regardless, an unremarkable evaluation in the ED does not preclude the development or presence of a serious of life threatening condition. As such, I instructed that the patient is to return immediately for any worsening or change in current symptoms.           Medical Decision Making              ED Medication(s):  Medications - No data to display    Discharge Medication List as of 4/25/2025  4:23 AM           Follow-up Information       [facility] In 3 days.    Contact information:  [address] [facility] [address] [phone]               [facility] - Emergency Dept..    Specialty: Emergency Medicine  Why: As needed, If symptoms worsen  Contact information:  [address] [facility] [address] [phone]                               Scribe Attestation:   Scribe #1: I  performed the above scribed service and the documentation accurately describes the services I performed. I attest to the accuracy of the note.     Attending:   Physician Attestation Statement for Scribe #1: I, Juan Russell MD, personally performed the services described in this documentation, as scribed by Maliha Romero, in my presence, and it is both accurate and complete.           Clinical Impression       ICD-10-CM ICD-9-CM   1. Pain in both feet  M79.671 729.5    M79.672        Disposition:   Disposition: Discharged  Condition: Stable         Juan Russell MD  04/25/25 0584

## 2025-05-18 NOTE — ED NOTES
Patient becoming increasingly more restless and agitated; patient ripped out of right arm soft restraint and began pulling at IV line attempting to take it out, trying to climb out of bed, yelling and screaming and tearing off all the medical equipment. very confused and delusional. Patient placed in 4 point leather restraints; Yamil Nunez, NP contacted to have order place for violent restraints.    [de-identified] : ear pulling ear pain [[email or web address]] : Blood on the outside of the right ear.  change in sleep pattern for 2 days now